# Patient Record
Sex: FEMALE | Race: BLACK OR AFRICAN AMERICAN | Employment: UNEMPLOYED | URBAN - METROPOLITAN AREA
[De-identification: names, ages, dates, MRNs, and addresses within clinical notes are randomized per-mention and may not be internally consistent; named-entity substitution may affect disease eponyms.]

---

## 2017-02-21 LAB
ANTIBODY SCREEN, EXTERNAL: NEGATIVE
CHLAMYDIA, EXTERNAL: NEGATIVE
HBSAG, EXTERNAL: NEGATIVE
HIV, EXTERNAL: NEGATIVE
N. GONORRHEA, EXTERNAL: NEGATIVE
RUBELLA, EXTERNAL: NORMAL
T. PALLIDUM, EXTERNAL: NEGATIVE
TYPE, ABO & RH, EXTERNAL: NORMAL

## 2017-07-16 ENCOUNTER — HOSPITAL ENCOUNTER (OUTPATIENT)
Age: 32
Setting detail: OBSERVATION
Discharge: HOME | End: 2017-07-16
Attending: OBSTETRICS & GYNECOLOGY | Admitting: OBSTETRICS & GYNECOLOGY
Payer: COMMERCIAL

## 2017-07-16 VITALS
HEART RATE: 93 BPM | WEIGHT: 171 LBS | BODY MASS INDEX: 27.48 KG/M2 | RESPIRATION RATE: 16 BRPM | SYSTOLIC BLOOD PRESSURE: 130 MMHG | TEMPERATURE: 98.5 F | HEIGHT: 66 IN | DIASTOLIC BLOOD PRESSURE: 65 MMHG

## 2017-07-16 PROBLEM — Z34.90 PREGNANCY: Status: ACTIVE | Noted: 2017-07-16

## 2017-07-16 LAB
APPEARANCE UR: CLEAR
BACTERIA URNS QL MICRO: NEGATIVE /HPF
BILIRUB UR QL: NEGATIVE
COLOR UR: ABNORMAL
EPITH CASTS URNS QL MICRO: ABNORMAL /LPF
GLUCOSE UR STRIP.AUTO-MCNC: 250 MG/DL
HGB UR QL STRIP: NEGATIVE
HYALINE CASTS URNS QL MICRO: ABNORMAL /LPF (ref 0–5)
KETONES UR QL STRIP.AUTO: NEGATIVE MG/DL
LEUKOCYTE ESTERASE UR QL STRIP.AUTO: ABNORMAL
NITRITE UR QL STRIP.AUTO: NEGATIVE
PH UR STRIP: 7 [PH] (ref 5–8)
PROT UR STRIP-MCNC: NEGATIVE MG/DL
RBC #/AREA URNS HPF: ABNORMAL /HPF (ref 0–5)
SP GR UR REFRACTOMETRY: 1.01 (ref 1–1.03)
UA: UC IF INDICATED,UAUC: ABNORMAL
UROBILINOGEN UR QL STRIP.AUTO: 0.2 EU/DL (ref 0.2–1)
WBC URNS QL MICRO: ABNORMAL /HPF (ref 0–4)

## 2017-07-16 PROCEDURE — 99284 EMERGENCY DEPT VISIT MOD MDM: CPT

## 2017-07-16 PROCEDURE — 81001 URINALYSIS AUTO W/SCOPE: CPT | Performed by: OBSTETRICS & GYNECOLOGY

## 2017-07-16 PROCEDURE — 99218 HC RM OBSERVATION: CPT

## 2017-07-16 PROCEDURE — 87086 URINE CULTURE/COLONY COUNT: CPT | Performed by: OBSTETRICS & GYNECOLOGY

## 2017-07-16 RX ORDER — CALCIUM CARBONATE 200(500)MG
1 TABLET,CHEWABLE ORAL 2 TIMES DAILY
COMMUNITY

## 2017-07-16 RX ORDER — NITROFURANTOIN 25; 75 MG/1; MG/1
100 CAPSULE ORAL 2 TIMES DAILY
Qty: 14 CAP | Refills: 0 | Status: ON HOLD | OUTPATIENT
Start: 2017-07-16 | End: 2017-10-06

## 2017-07-16 NOTE — IP AVS SNAPSHOT
Current Discharge Medication List  
  
START taking these medications Dose & Instructions Dispensing Information Comments Morning Noon Evening Bedtime  
 nitrofurantoin (macrocrystal-monohydrate) 100 mg capsule Commonly known as:  MACROBID Your last dose was: Your next dose is:    
   
   
 Dose:  100 mg Take 1 Cap by mouth two (2) times a day. Quantity:  14 Cap Refills:  0 CONTINUE these medications which have NOT CHANGED Dose & Instructions Dispensing Information Comments Morning Noon Evening Bedtime  
 calcium carbonate 200 mg calcium (500 mg) Chew Commonly known as:  TUMS Your last dose was: Your next dose is:    
   
   
 Dose:  1 Tab Take 1 Tab by mouth two (2) times a day. Refills:  0 PRENATAL + DHA PO Your last dose was: Your next dose is: Take  by mouth. Refills:  0 STOP taking these medications   
 norethindrone-ethinyl estradiol 1-35 mg-mcg Tab Commonly known as:  ORTHO-NOVUM 1-35 TAB, NORTREL 1-35 TAB  
   
  
 oxyCODONE-acetaminophen 5-325 mg per tablet Commonly known as:  PERCOCET Where to Get Your Medications Information on where to get these meds will be given to you by the nurse or doctor. ! Ask your nurse or doctor about these medications  
  nitrofurantoin (macrocrystal-monohydrate) 100 mg capsule

## 2017-07-16 NOTE — IP AVS SNAPSHOT
[address] 
[phone] Patient: Jacqui Neri MRN: [id number] :1985 You are allergic to the following No active allergies Recent Documentation Height Weight Breastfeeding? BMI OB Status Smoking Status 1.676 m 77.6 kg No 27.6 kg/m2 Pregnant Never Smoker Unresulted Labs Order Current Status CULTURE, URINE In process Emergency Contacts Name Discharge Info Relation Home Work Mobile [place] Mays  Spouse [3] [phone] About your hospitalization You were admitted on:  N/A You last received care in the:  [facility] 3 LABOR & DELIVERY You were discharged on:  2017 Unit phone number:  [phone] Why you were hospitalized Your primary diagnosis was:  Not on File Providers Seen During Your Hospitalizations Provider Role Specialty Primary office phone Radha Ramires DO Attending Provider Obstetrics & Gynecology [phone] Your Primary Care Physician (PCP) Primary Care Physician Office Phone Office Fax NONE ** None ** ** None ** Follow-up Information Follow up With Details Comments Contact Info Radha Ramires DO Go on 2017  [address] [facility] [address] [place] [address] 
[phone] Current Discharge Medication List  
  
START taking these medications Dose & Instructions Dispensing Information Comments Morning Noon Evening Bedtime  
 nitrofurantoin (macrocrystal-monohydrate) 100 mg capsule Commonly known as:  MACROBID Your last dose was: Your next dose is:    
   
   
 Dose:  100 mg Take 1 Cap by mouth two (2) times a day. Quantity:  14 Cap Refills:  0 CONTINUE these medications which have NOT CHANGED Dose & Instructions Dispensing Information Comments Morning Noon Evening Bedtime calcium carbonate 200 mg calcium (500 mg) Chew Commonly known as:  TUMS Your last dose was: Your next dose is:    
   
   
 Dose:  1 Tab Take 1 Tab by mouth two (2) times a day. Refills:  0 PRENATAL + DHA PO Your last dose was: Your next dose is: Take  by mouth. Refills:  0 STOP taking these medications   
 norethindrone-ethinyl estradiol 1-35 mg-mcg Tab Commonly known as:  ORTHO-NOVUM 1-35 TAB, NORTREL 1-35 TAB  
   
  
 oxyCODONE-acetaminophen 5-325 mg per tablet Commonly known as:  PERCOCET Where to Get Your Medications Information on where to get these meds will be given to you by the nurse or doctor. ! Ask your nurse or doctor about these medications  
  nitrofurantoin (macrocrystal-monohydrate) 100 mg capsule Discharge Instructions  DISCHARGE INSTRUCTIONS Name: Diamante Ruiz YOB: 1985 Primary Diagnosis: Active Problems: * No active hospital problems. * Introduction: You have visited the hospital because you thought you were in  labor. These guidelines are for your information at home to help prevent repeated problems. In general, you should remember: 
? Empty your bladder every 2-3 hours. ? Avoid breast stimulation (including showers where the water stream is on your breasts)-this can cause contractions. ? Rest means lying down. ? Contractions and cramping happen more often in evening and nighttime. Diet/Diet Restrictions: Follow your regularly prescribed diet Physical Activity / Restrictions / Safety:  
 
* Activity at home is based on how strong your  labor has been, You should follow the following activity guidelines. As tolerated, avoid heavy lifting. and Activity based on symptoms: Lie down on your side if you feel contractions or tightening in your abdomen. Discharge Instructions/ Special Treatment/ Home Care Needs:  
 
Call your provider if: 
? Uterine cramping (menstrual-like cramps, intermittent or constant ? Uterine contractions every 10-15 minutes or more frequently ? Low abdominal pressure ( pelvic pressure) ? Dull low backache (intermittent or constant) ? Increase or change in vaginal discharge ? Feeling that the baby is \"pushing down\" ? Abdominal cramping with or without diarrhea ? Any leaking of fluid or vaginal bleeding If any of these symptoms are experienced, stop what you are doing, lie down on your side, drink two to three glasses of water and wait one hour. If the symptoms persist or get worse, call your provider. Pain Management:  
 
 
 
Discharge Checklist-NURSING TO COMPLETE:  
 
Date and Time of Discharge: Date: 7/16/2017 Time: 4:51 AM 
 
Return of:  
Dental Appliance: Dental Appliances: None Vision: Visual Aid: Glasses Hearing Aid:   
Jewelry: Jewelry: Ring Clothing: Clothing: At bedside Other Valuables: Other Valuables: Cell Phone, [address] Valuables sent to safe: Personal Items Sent to Safe: None Prescription Given: yes Medication Instruction Sheet(s), including side effects, provided: yes Accompanied By: Family Mode of Transportation: 
 
Discharge Disposition: Home I have had the opportunity to make my options or choices for discharge. I have received and understand these instructions. URINARY TRACT INFECTION DISCHARGE INSTRUCTIONS Name: Stefanie Beard YOB: 1985 Primary Diagnosis: Active Problems: * No active hospital problems. * Introduction: You came to the hospital because you had one or some of the following symptoms: pain, tenderness in the bladder area, fever, frequent or painful urination, burning on urination or uterine irritability. Based on your evaluation, you have been diagnosed with a bladder infection. Prescribed medication and instructions follow to treat the infection. You are now ready to be sent home. In general, you should remember: 
 
? Empty your bladder at least every 2-3 hours. An over distended bladder can lead to infection. ? Wipe front to back after urination, avoid wiping back to front as you may transfer bacteria from the rectum to the urethra (opening to the bladder). ? Drink 8-10 glasses of water daily. ? Take medication as prescribed. Take all of the medicine even though you start to feel better. ? Know that bladder infections are the most common cause of pre-term labor. Physical Activity / Restrictions / Safety: As tolerated. Other:  
    
Discharge Instructions/ Special Treatment/ Home Care Needs:  
 
Call your provider if: 
? Your symptoms dont improve 24-36 hours after starting medication. ? Pelvic pressure continues after 24-36 hours of treatment with medication. ? You develop low backache along with pelvic pressure. ? You develop cramps or contractions-more frequent than 8 in one hour or four in twenty minutes. ? Your baby is not moving as much as usual-at least 4 fetal movements in 1 hour after drinking and resting on your side for one hour. ? Other:  
 
 
 
Discharge Orders None Introducing [facility] SERVICES! Dear Bertrand Flanagan: Thank you for requesting a [employer] account. Our records indicate that you already have an active [employer] account. You can access your account anytime at [email or web address] Did you know that you can access your hospital and ER discharge instructions at any time in [employer]? You can also review all of your test results from your hospital stay or ER visit. Additional Information If you have questions, please visit the Frequently Asked Questions section of the [employer] website at [email or web address] Remember, [employer] is NOT to be used for urgent needs. For medical emergencies, dial 911. Now available from your iPhone and Android! General Information Please provide this summary of care documentation to your next provider. Patient Signature:  ____________________________________________________________ Date:  ____________________________________________________________  
  
Paige Spare Provider Signature:  ____________________________________________________________ Date:  ____________________________________________________________

## 2017-07-16 NOTE — PROGRESS NOTES
8440 Patient arrived ambulatory to [facility] with complaints of abdominal cramping and tightness that comes every 7 minutes and started at Ravi [facility] 3701. Patient denies any LOF or VB. Reports positive fetal movement. Patient states that she drank 4-5 bottles full of a 28 ounce water bottle today. 3511 Notified Dr. Marisa De Santiago of patient arrival and status. Order received to send U/A with reflex. She states that she will be right out to see patient. 0 Dr. Marisa De Santiago at the bedside. SVE closed/thick. 0400 Dr. Marisa De Santiago on the unit. Notified that U/A results are back. Plan to monitor patient until [id number] and if no change in patient status she can be discharged. 6680 Patient taken off of monitor. 050 Patient discharged home with discharge instructions, Macrobid prescription, and all questions answered.

## 2017-07-16 NOTE — DISCHARGE INSTRUCTIONS
DISCHARGE INSTRUCTIONS    Name: Eric Appiah  YOB: 1985  Primary Diagnosis: Active Problems:    * No active hospital problems. *      Introduction: You have visited the hospital because you thought you were in  labor. These guidelines are for your information at home to help prevent repeated problems. In general, you should remember:   Empty your bladder every 2-3 hours.  Avoid breast stimulation (including showers where the water stream is on your breasts)-this can cause contractions.  Rest means lying down.  Contractions and cramping happen more often in evening and nighttime. Diet/Diet Restrictions: Follow your regularly prescribed diet    Physical Activity / Restrictions / Safety:     * Activity at home is based on how strong your  labor has been, You should follow the following activity guidelines. As tolerated, avoid heavy lifting. and Activity based on symptoms: Lie down on your side if you feel contractions or tightening in your abdomen. Discharge Instructions/ Special Treatment/ Home Care Needs:     Call your provider if:   Uterine cramping (menstrual-like cramps, intermittent or constant   Uterine contractions every 10-15 minutes or more frequently   Low abdominal pressure ( pelvic pressure)   Dull low backache (intermittent or constant)   Increase or change in vaginal discharge   Feeling that the baby is \"pushing down\"   Abdominal cramping with or without diarrhea   Any leaking of fluid or vaginal bleeding  If any of these symptoms are experienced, stop what you are doing, lie down on your side, drink two to three glasses of water and wait one hour. If the symptoms persist or get worse, call your provider.     Pain Management:         Discharge Checklist-NURSING TO COMPLETE:     Date and Time of Discharge: Date: 2017 Time: 4:51 AM    Return of:   Dental Appliance: Dental Appliances: None  Vision: Visual Aid: Glasses  Hearing Aid:    Jewelry: Jewelry: Ring  Clothing: Clothing: At bedside  Other Valuables: Other Valuables: Cell Phone, Purse  Valuables sent to safe: Personal Items Sent to Safe: None    Prescription Given: yes  Medication Instruction Sheet(s), including side effects, provided: yes    Accompanied By: Family    Mode of Transportation:    Discharge Disposition: Home    I have had the opportunity to make my options or choices for discharge. I have received and understand these instructions. URINARY TRACT INFECTION DISCHARGE INSTRUCTIONS    Name: Oumar Solo  YOB: 1985  Primary Diagnosis: Active Problems:    * No active hospital problems. *      Introduction: You came to the hospital because you had one or some of the following symptoms: pain, tenderness in the bladder area, fever, frequent or painful urination, burning on urination or uterine irritability. Based on your evaluation, you have been diagnosed with a bladder infection. Prescribed medication and instructions follow to treat the infection. You are now ready to be sent home. In general, you should remember:     Empty your bladder at least every 2-3 hours. An over distended bladder can lead to infection.  Wipe front to back after urination, avoid wiping back to front as you may transfer bacteria from the rectum to the urethra (opening to the bladder).  Drink 8-10 glasses of water daily.  Take medication as prescribed. Take all of the medicine even though you start to feel better.  Know that bladder infections are the most common cause of pre-term labor. Physical Activity / Restrictions / Safety:     As tolerated. Other:        Discharge Instructions/ Special Treatment/ Home Care Needs:     Call your provider if:   Your symptoms dont improve 24-36 hours after starting medication.  Pelvic pressure continues after 24-36 hours of treatment with medication.  You develop low backache along with pelvic pressure.    You develop cramps or contractions-more frequent than 8 in one hour or four in twenty minutes.  Your baby is not moving as much as usual-at least 4 fetal movements in 1 hour after drinking and resting on your side for one hour.    Other:

## 2017-07-16 NOTE — H&P
Obstetrics Admission H&P    Colletta Handsome  [id number]  1985    Chief Complaint:  Pregnancy and Contractions    HPI: 27 y.o. female  28w5d with Estimated Date of Delivery: 10/3/17  Pregnancy has been complicated by PCOS. Patient complains of mild \"tightening\"  for 3 hours. Patient denies vaginal bleeding , vaginal leaking of fluid  and burning with urination, urinary frequency, urinary urgency and constipation. Denies unusual activity today or IC. Awakened with a tightening q7 mins. Still feels it now but she says it is improved. ROS:  Pertinent items are noted in HPI. OB History      Para Term  AB Living    3    1     SAB TAB Ectopic Molar Multiple Live Births    1             Past Medical History:   Diagnosis Date    Abnormal Papanicolaou smear of cervix     Abnormal pap in early s - colpo - WNL now    Coagulation disorder ([facility] [address]     D & C for missed AB    Infertility, female     Femara and trigger shot with this pregnancy    Other unknown and unspecified cause of morbidity or mortality     PCOS    PCOS (polycystic ovarian syndrome)     Polycystic disease, ovaries      Past Surgical History:   Procedure Laterality Date    HX LAP CHOLECYSTECTOMY  9/23/15     Dr. Park Dugan      sukhi in left femur and screws    HX OTHER SURGICAL      [place] teeth removed ?  HX WISDOM TEETH EXTRACTION       Social History     Social History    Marital status:      Spouse name: N/A    Number of children: N/A    Years of education: N/A     Occupational History    Not on file.      Social History Main Topics    Smoking status: Never Smoker    Smokeless tobacco: Never Used    Alcohol use No    Drug use: No    Sexual activity: No     Other Topics Concern    Not on file     Social History Narrative     Family History   Problem Relation Age of Onset    Cancer Mother     Hypertension Mother      No Known Allergies  Prior to Admission Medications Prescriptions Last Dose Informant Patient Reported? Taking? PRENATAL VIT 91/IRON/FOLIC/DHA (PRENATAL + DHA PO) 2017 at 0800  Yes Yes   Sig: Take  by mouth.   calcium carbonate (TUMS) 200 mg calcium (500 mg) chew 7/15/2017 at 2100  Yes Yes   Sig: Take 1 Tab by mouth two (2) times a day. norethindrone-ethinyl estradiol (ORTHO-NOVUM 1-35 TAB, NORTREL 1-35 TAB) 1-35 mg-mcg per tablet Not Taking at Unknown time  Yes No   Sig: Take 1 Tab by mouth. oxyCODONE-acetaminophen (PERCOCET) 5-325 mg per tablet Not Taking at Unknown time  No No   Si to 2 tablets every 4 hours as needed for pain      Facility-Administered Medications: None         Vitals:  Patient Vitals for the past 8 hrs:   BP Temp Pulse Resp Height Weight   17 0244 - - - - 5' 6\" (1.676 m) 77.6 kg (171 lb)   17 0242 130/65 98.5 °F (36.9 °C) 93 16 - -     Temp (24hrs), Av.5 °F (36.9 °C), Min:98.5 °F (36.9 °C), Max:98.5 °F (36.9 °C)    I&O:                    Exam:  Patient without distress. Abdomen soft, non-tender               Fundus soft and non tender               Perineum No sign of blood or amniotic fluid               Lower extremities edema No              Cervical Exam:  Closed/Thick/High  Membranes:   Intact    Fetal Heart Rate: nonreactive but appropriate for gestational age  Uterine Activity: None         Labs: No results found for this or any previous visit (from the past 24 hour(s)). Assessment and Plan:    IUP @ 28+ wks with abd. Pain which sounds c/w uterine activity but none detected on toco despite re-adjusting several times. No evidence PTL or PROM. UA sent and pending. PO hydrate, continue to monitor. If no ctx detected after a period of monitoring and UA is neg, will likely d/c home later. Reviewed plan with pt and . Addendum:  UA c/w UTI. Will Rx Macrobid, monitor for ~30 more mins (total of >2 hrs) and d/c home. Advised pt.

## 2017-07-17 LAB
BACTERIA SPEC CULT: NORMAL
CC UR VC: NORMAL
SERVICE CMNT-IMP: NORMAL

## 2017-09-08 LAB — GRBS, EXTERNAL: POSITIVE

## 2017-10-05 ENCOUNTER — HOSPITAL ENCOUNTER (OUTPATIENT)
Dept: OTHER | Age: 32
Discharge: HOME | End: 2017-10-05
Payer: COMMERCIAL

## 2017-10-05 VITALS — WEIGHT: 174 LBS | HEIGHT: 66 IN | BODY MASS INDEX: 27.97 KG/M2

## 2017-10-05 LAB
ERYTHROCYTE [DISTWIDTH] IN BLOOD BY AUTOMATED COUNT: 14 % (ref 11.5–14.5)
HCT VFR BLD AUTO: 38 % (ref 35–47)
HGB BLD-MCNC: 12.6 G/DL (ref 11.5–16)
MCH RBC QN AUTO: 30.1 PG (ref 26–34)
MCHC RBC AUTO-ENTMCNC: 33.2 G/DL (ref 30–36.5)
MCV RBC AUTO: 90.9 FL (ref 80–99)
PLATELET # BLD AUTO: 107 K/UL (ref 150–400)
RBC # BLD AUTO: 4.18 M/UL (ref 3.8–5.2)
WBC # BLD AUTO: 5.2 K/UL (ref 3.6–11)

## 2017-10-05 PROCEDURE — 85027 COMPLETE CBC AUTOMATED: CPT | Performed by: OBSTETRICS & GYNECOLOGY

## 2017-10-05 PROCEDURE — 36415 COLL VENOUS BLD VENIPUNCTURE: CPT | Performed by: OBSTETRICS & GYNECOLOGY

## 2017-10-05 NOTE — PROGRESS NOTES
Patient here for Pre-Admission Testing (PAT) for scheduled induction. PAT packet reviewed with the patient. Labs drawn and sent. Education provided that patient may have clear liquids after midnight and to arrive at 0600 on 10/06/17. Patient verbalizes understanding and sent home with PAT packet for further review. Patient instructed to take no medication(s) on the morning of her scheduled procedure.

## 2017-10-06 ENCOUNTER — HOSPITAL ENCOUNTER (INPATIENT)
Age: 32
LOS: 2 days | Discharge: HOME | End: 2017-10-08
Attending: OBSTETRICS & GYNECOLOGY | Admitting: OBSTETRICS & GYNECOLOGY
Payer: COMMERCIAL

## 2017-10-06 PROCEDURE — 77030031139 HC SUT VCRL2 J&J -A

## 2017-10-06 PROCEDURE — 74011250636 HC RX REV CODE- 250/636

## 2017-10-06 PROCEDURE — 0DQR0ZZ REPAIR ANAL SPHINCTER, OPEN APPROACH: ICD-10-PCS | Performed by: OBSTETRICS & GYNECOLOGY

## 2017-10-06 PROCEDURE — 74011000250 HC RX REV CODE- 250

## 2017-10-06 PROCEDURE — 75410000000 HC DELIVERY VAGINAL/SINGLE

## 2017-10-06 PROCEDURE — 74011250636 HC RX REV CODE- 250/636: Performed by: OBSTETRICS & GYNECOLOGY

## 2017-10-06 PROCEDURE — 75410000003 HC RECOV DEL/VAG/CSECN EA 0.5 HR

## 2017-10-06 PROCEDURE — 65410000002 HC RM PRIVATE OB

## 2017-10-06 PROCEDURE — 74011250637 HC RX REV CODE- 250/637

## 2017-10-06 PROCEDURE — 74011250637 HC RX REV CODE- 250/637: Performed by: OBSTETRICS & GYNECOLOGY

## 2017-10-06 PROCEDURE — 75410000002 HC LABOR FEE PER 1 HR

## 2017-10-06 PROCEDURE — 74011000258 HC RX REV CODE- 258: Performed by: OBSTETRICS & GYNECOLOGY

## 2017-10-06 PROCEDURE — 99283 EMERGENCY DEPT VISIT LOW MDM: CPT

## 2017-10-06 RX ORDER — DOCUSATE SODIUM 100 MG/1
100 CAPSULE, LIQUID FILLED ORAL
Status: DISCONTINUED | OUTPATIENT
Start: 2017-10-06 | End: 2017-10-08 | Stop reason: HOSPADM

## 2017-10-06 RX ORDER — HYDROCORTISONE 1 %
CREAM (GRAM) TOPICAL AS NEEDED
Status: DISCONTINUED | OUTPATIENT
Start: 2017-10-06 | End: 2017-10-08 | Stop reason: HOSPADM

## 2017-10-06 RX ORDER — OXYCODONE AND ACETAMINOPHEN 5; 325 MG/1; MG/1
2 TABLET ORAL
Status: DISCONTINUED | OUTPATIENT
Start: 2017-10-06 | End: 2017-10-08 | Stop reason: HOSPADM

## 2017-10-06 RX ORDER — SODIUM CHLORIDE 0.9 % (FLUSH) 0.9 %
5-10 SYRINGE (ML) INJECTION EVERY 8 HOURS
Status: DISCONTINUED | OUTPATIENT
Start: 2017-10-06 | End: 2017-10-06

## 2017-10-06 RX ORDER — ACETAMINOPHEN 325 MG/1
650 TABLET ORAL
Status: DISCONTINUED | OUTPATIENT
Start: 2017-10-06 | End: 2017-10-08 | Stop reason: HOSPADM

## 2017-10-06 RX ORDER — ONDANSETRON 4 MG/1
4 TABLET, ORALLY DISINTEGRATING ORAL
Status: DISCONTINUED | OUTPATIENT
Start: 2017-10-06 | End: 2017-10-08 | Stop reason: HOSPADM

## 2017-10-06 RX ORDER — SODIUM CHLORIDE 0.9 % (FLUSH) 0.9 %
5-10 SYRINGE (ML) INJECTION AS NEEDED
Status: DISCONTINUED | OUTPATIENT
Start: 2017-10-06 | End: 2017-10-08 | Stop reason: HOSPADM

## 2017-10-06 RX ORDER — IBUPROFEN 400 MG/1
800 TABLET ORAL EVERY 8 HOURS
Status: DISCONTINUED | OUTPATIENT
Start: 2017-10-06 | End: 2017-10-08 | Stop reason: HOSPADM

## 2017-10-06 RX ORDER — SIMETHICONE 80 MG
80 TABLET,CHEWABLE ORAL
Status: DISCONTINUED | OUTPATIENT
Start: 2017-10-06 | End: 2017-10-08 | Stop reason: HOSPADM

## 2017-10-06 RX ORDER — TERBUTALINE SULFATE 1 MG/ML
0.25 INJECTION SUBCUTANEOUS AS NEEDED
Status: DISCONTINUED | OUTPATIENT
Start: 2017-10-06 | End: 2017-10-06

## 2017-10-06 RX ORDER — SODIUM CHLORIDE 900 MG/100ML
INJECTION INTRAVENOUS
Status: DISCONTINUED
Start: 2017-10-06 | End: 2017-10-06

## 2017-10-06 RX ORDER — NALBUPHINE HYDROCHLORIDE 10 MG/ML
10 INJECTION, SOLUTION INTRAMUSCULAR; INTRAVENOUS; SUBCUTANEOUS
Status: DISCONTINUED | OUTPATIENT
Start: 2017-10-06 | End: 2017-10-06

## 2017-10-06 RX ORDER — MINERAL OIL
OIL (ML) ORAL
Status: COMPLETED
Start: 2017-10-06 | End: 2017-10-06

## 2017-10-06 RX ORDER — OXYCODONE AND ACETAMINOPHEN 5; 325 MG/1; MG/1
1 TABLET ORAL
Status: DISCONTINUED | OUTPATIENT
Start: 2017-10-06 | End: 2017-10-08 | Stop reason: HOSPADM

## 2017-10-06 RX ORDER — OXYTOCIN IN 5 % DEXTROSE 30/500 ML
1-25 PLASTIC BAG, INJECTION (ML) INTRAVENOUS
Status: DISCONTINUED | OUTPATIENT
Start: 2017-10-06 | End: 2017-10-06

## 2017-10-06 RX ORDER — NALOXONE HYDROCHLORIDE 0.4 MG/ML
0.4 INJECTION, SOLUTION INTRAMUSCULAR; INTRAVENOUS; SUBCUTANEOUS AS NEEDED
Status: DISCONTINUED | OUTPATIENT
Start: 2017-10-06 | End: 2017-10-06

## 2017-10-06 RX ORDER — SODIUM CHLORIDE 0.9 % (FLUSH) 0.9 %
5-10 SYRINGE (ML) INJECTION AS NEEDED
Status: DISCONTINUED | OUTPATIENT
Start: 2017-10-06 | End: 2017-10-06

## 2017-10-06 RX ORDER — AMMONIA 15 % (W/V)
1 AMPUL (EA) INHALATION AS NEEDED
Status: DISCONTINUED | OUTPATIENT
Start: 2017-10-06 | End: 2017-10-08 | Stop reason: HOSPADM

## 2017-10-06 RX ORDER — SODIUM CHLORIDE, SODIUM LACTATE, POTASSIUM CHLORIDE, CALCIUM CHLORIDE 600; 310; 30; 20 MG/100ML; MG/100ML; MG/100ML; MG/100ML
125 INJECTION, SOLUTION INTRAVENOUS CONTINUOUS
Status: DISCONTINUED | OUTPATIENT
Start: 2017-10-06 | End: 2017-10-06

## 2017-10-06 RX ORDER — OXYTOCIN/RINGER'S LACTATE 20/1000 ML
125-1000 PLASTIC BAG, INJECTION (ML) INTRAVENOUS AS NEEDED
Status: DISCONTINUED | OUTPATIENT
Start: 2017-10-06 | End: 2017-10-08 | Stop reason: HOSPADM

## 2017-10-06 RX ORDER — MAG HYDROX/ALUMINUM HYD/SIMETH 200-200-20
30 SUSPENSION, ORAL (FINAL DOSE FORM) ORAL
Status: DISCONTINUED | OUTPATIENT
Start: 2017-10-06 | End: 2017-10-06

## 2017-10-06 RX ORDER — ACETAMINOPHEN 325 MG/1
650 TABLET ORAL
Status: DISCONTINUED | OUTPATIENT
Start: 2017-10-06 | End: 2017-10-06

## 2017-10-06 RX ORDER — ONDANSETRON 2 MG/ML
4 INJECTION INTRAMUSCULAR; INTRAVENOUS
Status: DISCONTINUED | OUTPATIENT
Start: 2017-10-06 | End: 2017-10-06

## 2017-10-06 RX ORDER — HYDROCORTISONE ACETATE PRAMOXINE HCL 2.5; 1 G/100G; G/100G
CREAM TOPICAL AS NEEDED
Status: DISCONTINUED | OUTPATIENT
Start: 2017-10-06 | End: 2017-10-08 | Stop reason: HOSPADM

## 2017-10-06 RX ORDER — SODIUM CHLORIDE 0.9 % (FLUSH) 0.9 %
5-10 SYRINGE (ML) INJECTION EVERY 8 HOURS
Status: DISCONTINUED | OUTPATIENT
Start: 2017-10-06 | End: 2017-10-08 | Stop reason: HOSPADM

## 2017-10-06 RX ORDER — DIPHENHYDRAMINE HCL 25 MG
25 CAPSULE ORAL
Status: DISCONTINUED | OUTPATIENT
Start: 2017-10-06 | End: 2017-10-08 | Stop reason: HOSPADM

## 2017-10-06 RX ORDER — AMPICILLIN 2 G/1
INJECTION, POWDER, FOR SOLUTION INTRAVENOUS
Status: DISCONTINUED
Start: 2017-10-06 | End: 2017-10-06

## 2017-10-06 RX ORDER — OXYTOCIN IN 5 % DEXTROSE 30/500 ML
PLASTIC BAG, INJECTION (ML) INTRAVENOUS
Status: COMPLETED
Start: 2017-10-06 | End: 2017-10-06

## 2017-10-06 RX ORDER — LIDOCAINE HYDROCHLORIDE 10 MG/ML
INJECTION INFILTRATION; PERINEURAL
Status: COMPLETED
Start: 2017-10-06 | End: 2017-10-06

## 2017-10-06 RX ADMIN — IBUPROFEN 800 MG: 400 TABLET, FILM COATED ORAL at 19:48

## 2017-10-06 RX ADMIN — MINERAL OIL 30 ML: 99.9 LIQUID ORAL; TOPICAL at 08:15

## 2017-10-06 RX ADMIN — AMPICILLIN SODIUM 2 G: 2 INJECTION, POWDER, FOR SOLUTION INTRAVENOUS at 05:37

## 2017-10-06 RX ADMIN — SODIUM CHLORIDE, SODIUM LACTATE, POTASSIUM CHLORIDE, AND CALCIUM CHLORIDE 125 ML/HR: 600; 310; 30; 20 INJECTION, SOLUTION INTRAVENOUS at 05:33

## 2017-10-06 RX ADMIN — DOCUSATE SODIUM 100 MG: 100 CAPSULE, LIQUID FILLED ORAL at 19:47

## 2017-10-06 RX ADMIN — Medication 30000 MILLI-UNITS: at 08:30

## 2017-10-06 RX ADMIN — OXYCODONE HYDROCHLORIDE AND ACETAMINOPHEN 1 TABLET: 5; 325 TABLET ORAL at 17:36

## 2017-10-06 RX ADMIN — LIDOCAINE HYDROCHLORIDE: 10 INJECTION, SOLUTION INFILTRATION; PERINEURAL at 08:27

## 2017-10-06 RX ADMIN — IBUPROFEN 800 MG: 400 TABLET, FILM COATED ORAL at 10:37

## 2017-10-06 RX ADMIN — OXYCODONE HYDROCHLORIDE AND ACETAMINOPHEN 1 TABLET: 5; 325 TABLET ORAL at 23:50

## 2017-10-06 RX ADMIN — OXYCODONE HYDROCHLORIDE AND ACETAMINOPHEN 1 TABLET: 5; 325 TABLET ORAL at 10:38

## 2017-10-06 NOTE — LACTATION NOTE
This note was copied from a baby's chart. Baby nursing well after delivery, deep latch obtained, mother is comfortable, baby feeding vigorously with rhythmic suck, swallow, breathe pattern, both breasts offered, baby is skin to skin for feeding. Mother has history of PCOS. Pumping recommended to expedite milk transition and ensure adequate supply. Baby is a few hours old and is nursing vigorously at this time.

## 2017-10-06 NOTE — IP AVS SNAPSHOT
[address] 
[phone] Patient: Ori Schaffer MRN: [id number] :1985 Current Discharge Medication List  
  
START taking these medications Dose & Instructions Dispensing Information Comments Morning Noon Evening Bedtime  
 ibuprofen 800 mg tablet Commonly known as:  MOTRIN Your last dose was: Your next dose is:    
   
   
 Dose:  800 mg Take 1 Tab by mouth every eight (8) hours as needed for Pain. Quantity:  30 Tab Refills:  1  
     
   
   
   
  
 oxyCODONE-acetaminophen 5-325 mg per tablet Commonly known as:  PERCOCET Your last dose was: Your next dose is:    
   
   
 Dose:  1 Tab Take 1 Tab by mouth every four (4) hours as needed. Max Daily Amount: 6 Tabs. Quantity:  20 Tab Refills:  0 CONTINUE these medications which have NOT CHANGED Dose & Instructions Dispensing Information Comments Morning Noon Evening Bedtime  
 calcium carbonate 200 mg calcium (500 mg) Chew Commonly known as:  TUMS Your last dose was: Your next dose is:    
   
   
 Dose:  1 Tab Take 1 Tab by mouth two (2) times a day. Refills:  0 PRENATAL + DHA PO Your last dose was: Your next dose is: Take  by mouth. Refills:  0 Where to Get Your Medications Information on where to get these meds will be given to you by the nurse or doctor. ! Ask your nurse or doctor about these medications  
  ibuprofen 800 mg tablet  
 oxyCODONE-acetaminophen 5-325 mg per tablet

## 2017-10-06 NOTE — PROCEDURES
Delivery Note    Obstetrician:  Ursula Casas MD    Assistant: none    Pre-Delivery Diagnosis: Term pregnancy. Spontaneous labor, GBS positive    Post-Delivery Diagnosis: same but delivered male infant    Intrapartum Event: None    Procedure: Spontaneous vaginal delivery    Epidural: NO    Monitor:  Fetal Heart Tones - External and Uterine Contractions - External    Indications for instrumental delivery: none    Estimated Blood Loss: 300    Episiotomy: n/a    Laceration(s):  3rd degree    Laceration(s) repair: Vaginal laceration repaired with 2-0 vicryl running locked suture. Rectal exam confirmed intact rectal mucosa. Perineal body repaired with interrupted 2-0 vicryl suture to reapproximate muscle defect. 2-0 vicryl then used running fashion to reapproximate the skin. No suture present in the rectum. An additional figure of eight suture was placed at the introitus for hemostasis. This was done under local anesthesia with 20cc total 1% lidocaine. Presentation: Cephalic    Fetal Description: villeda    Fetal Position: Left Occiput Anterior    Birth Weight: pending    Birth Length: pending    Apgar - One Minute: 5    Apgar - Five Minutes: 9    Umbilical Cord: nuchal cord x 1 loose    Specimens: placenta discarded           Complications:  Shoulder dystocia noted at delivery after gentle traction attempted for delivery. Anterior shoulder was the right shoulder. Erlin performed. Attempt made to deliver posterior arm which did not deliver easily while awaiting nurse position for suprapubic pressure. Suprapubic pressure applied with easy delivery of anterior shoulder. Shoulder dystocia less than 30 seconds.            Cord Blood Results:   Information for the patient's :  Marco Osman [[id number]]   No results found for: PCTABR, PCTDIG, BILI, ABORH    Prenatal Labs:     Lab Results   Component Value Date/Time    ABO/Rh(D) A POSITIVE 2014 08:15 PM    HBsAg, External negative 02/21/2017    HIV, External negative 02/21/2017    Rubella, External immune 02/21/2017    Gonorrhea, External negative 02/21/2017    Chlamydia, External negative 02/21/2017    GrBStrep, External positive 09/08/2017        Attending Attestation: I was present and scrubbed for the entire procedure    Signed By:  Raelyn Dakins, MD     October 6, 2017

## 2017-10-06 NOTE — PROGRESS NOTES
0505- pt arrived complaining of contractions 4 minutes apart with increasing pressure. Pt planning to labor and deliver without epidural.     3108- cervical check by ROB Hernandez RN. 7-8/80/-1    7114- Dr. Danielle Coulter at bedside to discuss plan of care. 3605- Bedside, Verbal and Written shift change report given to Jeremy Andrews RN (oncoming nurse) by Leti Diallo RN (offgoing nurse). Report included the following information SBAR, Kardex, Intake/Output and MAR.

## 2017-10-06 NOTE — IP AVS SNAPSHOT
[address] 
[phone] Patient: Ann Marie [place] MRN: [id number] :1985 You are allergic to the following No active allergies Recent Documentation Breastfeeding? OB Status Smoking Status Unknown Recent pregnancy Never Smoker Emergency Contacts Name Discharge Info Relation Home Work Mobile [facility] DISCHARGE CAREGIVER [3] Spouse [3] [phone] About your hospitalization You were admitted on:  2017 You last received care in the:  [address] [facility] You were discharged on:  2017 Unit phone number:  [phone] Why you were hospitalized Your primary diagnosis was:  Not on File Your diagnoses also included:  Pregnancy, Normal Labor And Delivery Providers Seen During Your Hospitalizations Provider Role Specialty Primary office phone Sravani Chavez DO Attending Provider Obstetrics & Gynecology [phone] Kyle Valdez MD Attending Provider Gynecology [phone] Your Primary Care Physician (PCP) Primary Care Physician Office Phone Office Fax Yari Apple [phone] Follow-up Information Follow up With Details Comments Contact Info [facility] PLACE Call As needed for breastfeeding questions or concerns. [address], [address] [place] [address] 
[phone] Sravani Chavez DO   [address] 
[phone] Current Discharge Medication List  
  
START taking these medications Dose & Instructions Dispensing Information Comments Morning Noon Evening Bedtime  
 ibuprofen 800 mg tablet Commonly known as:  MOTRIN Your last dose was: Your next dose is:    
   
   
 Dose:  800 mg Take 1 Tab by mouth every eight (8) hours as needed for Pain. Quantity:  30 Tab Refills:  1  
     
   
   
   
  
 oxyCODONE-acetaminophen 5-325 mg per tablet Commonly known as:  PERCOCET Your last dose was: Your next dose is:    
   
   
 Dose:  1 Tab Take 1 Tab by mouth every four (4) hours as needed. Max Daily Amount: 6 Tabs. Quantity:  20 Tab Refills:  0 CONTINUE these medications which have NOT CHANGED Dose & Instructions Dispensing Information Comments Morning Noon Evening Bedtime  
 calcium carbonate 200 mg calcium (500 mg) Chew Commonly known as:  TUMS Your last dose was: Your next dose is:    
   
   
 Dose:  1 Tab Take 1 Tab by mouth two (2) times a day. Refills:  0 PRENATAL + DHA PO Your last dose was: Your next dose is: Take  by mouth. Refills:  0 Where to Get Your Medications Information on where to get these meds will be given to you by the nurse or doctor. ! Ask your nurse or doctor about these medications  
  ibuprofen 800 mg tablet  
 oxyCODONE-acetaminophen 5-325 mg per tablet Discharge Instructions POSTPARTUM DISCHARGE INSTRUCTIONS Name:  Barbara Christensen YOB: 1985 Admission Diagnosis:  Pregnancy Normal labor and delivery Discharge Diagnosis:   
Problem List as of 10/8/2017  Date Reviewed: 1/17/2014 Codes Class Noted - Resolved Normal labor and delivery ICD-10-CM: O80 
ICD-9-CM: 943  10/6/2017 - Present Pregnancy ICD-10-CM: Z34.90 ICD-9-CM: V22.2  7/16/2017 - Present Attending Physician:  Tramaine Langford MD 
 
Delivery Type:  Vaginal Childbirth with Episiotomy, Laceration or Tear: [facility] Your body will slowly heal in the next few weeks. It is easy to get too tired and overwhelmed during the first weeks after your baby is born. Changes in your hormones can shift your mood without warning. You may find it hard to meet the extra demands on your energy and time. Take it easy on yourself. Follow-up care is a key part of your treatment and safety. Be sure to make and go to all appointments, and call your doctor if you are having problems. It's also a good idea to know your test results and keep a list of the medicines you take. How can you care for yourself at home? Vaginal Bleeding and Cramps · After delivery, you will have a bloody discharge from the vagina. This will turn pink within a week and then white or yellow after about 10 days. It may last for 2 to 4 weeks or longer, until the uterus has healed. Use pads instead of tampons until you stop bleeding. · Do not worry if you pass some blood clots, as long as they are smaller than a golf ball. If you have a tear or stitches in your vaginal area, change the pad at least every 4 hours to prevent soreness and infection. · You may have cramps for the first few days after childbirth. These are normal and occur as the uterus shrinks to normal size. Take an over-the-counter pain medicine, such as acetaminophen (Tylenol), ibuprofen (Advil, Motrin), or naproxen (Aleve), for cramps. Read and follow all instructions on the label. Do not take aspirin, because it can cause more bleeding. Do not take acetaminophen (Tylenol) and other acetaminophen containing medications (i.e. Percocet) at the same time. Episiotomy, Lacerations or Tears · If you have stitches, they will dissolve on their own and do not need to be removed. · Put ice or a cold pack on your painful area for 10 to 20 minutes at a time, several times a day, for the first few days. Put a thin cloth between the ice and your skin. · Sit in a few inches of warm water (sitz bath) 3 times a day and after bowel movements. The warm water helps with pain and itching. If you do not have a tub, a warm shower might help. Breast fullness · Your breasts may overfill (engorge) in the first few days after delivery. To help milk flow and to relieve pain, warm your breasts in the shower or by using warm, moist towels before nursing. · If you are not nursing, do not put warmth on your breasts or touch your breasts. Wear a tight bra or sports bra and use ice until the fullness goes away. This usually takes 2 to 3 days. · Put ice or a cold pack on your breast after nursing to reduce swelling and pain. Put a thin cloth between the ice and your skin. Activity · Eat a balanced diet. Do not try to lose weight by cutting calories. Keep taking your prenatal vitamins, or take a multivitamin. · Get as much rest as you can. Try to take naps when your baby sleeps during the day. · Get some exercise every day. But do not do any heavy exercise until your doctor says it is okay. · Wait until you are healed (about 4 to 6 weeks) before you have sexual intercourse. Your doctor will tell you when it is okay to have sex. · Talk to your doctor about birth control. You can get pregnant even before your period returns. Also, you can get pregnant while you are breast-feeding. Mental Health · Many women get the \"baby blues\" during the first few days after childbirth. You may lose sleep, feel irritable, and cry easily. You may feel happy one minute and sad the next. Hormone changes are one cause of these emotional changes. Also, the demands of a new baby, along with visits from relatives or other family needs, add to a mother's stress. The \"baby blues\" often peak around the fourth day. Then they ease up in less than 2 weeks. · If your moodiness or anxiety lasts for more than 2 weeks, or if you feel like life is not worth living, you may have postpartum depression. This is different for each mother. Some mothers with serious depression may worry intensely about their infant's well-being.  Others may feel distant from their child. Some mothers might even feel that they might harm their baby. A mother may have signs of paranoia, wondering if someone is watching her. · With all the changes in your life, you may not know if you are depressed. Pregnancy sometimes causes changes in how you feel that are similar to the symptoms of depression. · Symptoms of depression include: · Feeling sad or hopeless and losing interest in daily activities. These are the most common symptoms of depression. · Sleeping too much or not enough. · Feeling tired. You may feel as if you have no energy. · Eating too much or too little. · POSTPARTUM SUPPORT INTERNATIONAL (PSI) offers a Warm line; Chat with the Expert phone sessions; Information and Articles about Pregnancy and Postpartum Mood Disorders; Comprehensive List of Free Support Groups; Knowledgeable local coordinators who will offer support, information, and resources; Guide to Resources on [employer]; Calendar of events in the  mood disorders community; Latest News and Research; and [facility] [address] for [employer]. Remember - You are not alone; You are not to blame; With help, you will be well. 0-311-939-PPD(7826). WWW. POSTPARTUM. NET · Writing or talking about death, such as writing suicide notes or talking about guns, knives, or pills. Keep the numbers for these national suicide hotlines: 1-339-506-TALK (7-783.179.6490) and 3-363-HPNMPMR (8-357-557-194.821.1226). If you or someone you know talks about suicide or feeling hopeless, get help right away. Constipation and Hemorrhoids Drink plenty of fluids, enough so that your urine is light yellow or clear like water. If you have kidney, heart, or liver disease and have to limit fluids, talk with your doctor before you increase the amount of fluids you drink. · Eat plenty of fiber each day. Have a bran muffin or bran cereal for breakfast, and try eating a piece of fruit for a mid-afternoon snack. · For painful, itchy hemorrhoids, put ice or a cold pack on the area several times a day for 10 minutes at a time. Follow this by putting a warm compress on the area for another 10 to 20 minutes or by sitting in a shallow, warm bath. When should you call for help? Call 911 anytime you think you may need emergency care. For example, call if: 
· You are thinking of hurting yourself, your baby, or anyone else. · You passed out (lost consciousness). · You have symptoms of a blood clot in your lung (called a pulmonary embolism). These may include: 
· Sudden chest pain. · Trouble breathing. · Coughing up blood. Call your doctor now or seek immediate medical care if: 
· You have severe vaginal bleeding. · You are soaking through a pad each hour for 2 or more hours. · Your vaginal bleeding seems to be getting heavier or is still bright red 4 days after delivery. · You are dizzy or lightheaded, or you feel like you may faint. · You are vomiting or cannot keep fluids down. · You have a fever. · You have new or more belly pain. · You pass tissue (not just blood). · Your vaginal discharge smells bad. · Your belly feels tender or full and hard. · Your breasts are continuously painful or red. · You feel sad, anxious, or hopeless for more than a few days. · You have sudden, severe pain in your belly. · You have symptoms of a blood clot in your leg (called a deep vein thrombosis), such as: 
· Pain in your calf, back of the knee, thigh, or groin. · Redness and swelling in your leg or groin. · You have symptoms of preeclampsia, such as: 
· Sudden swelling of your face, hands, or feet. · New vision problems (such as dimness or blurring). · A severe headache. · Your blood pressure is higher than it should be or rises suddenly. · You have new nausea or vomiting. Watch closely for changes in your health, and be sure to contact your doctor if you have any problems. Additional Information:  {Postpartum Pregnancy Complications:19616} These are general instructions for a healthy lifestyle: No smoking/ No tobacco products/ Avoid exposure to second hand smoke Surgeon General's Warning:  Quitting smoking now greatly reduces serious risk to your health. Obesity, smoking, and sedentary lifestyle greatly increases your risk for illness A healthy diet, regular physical exercise & weight monitoring are important for maintaining a healthy lifestyle Recognize signs and symptoms of STROKE: 
 
F-face looks uneven A-arms unable to move or move unevenly S-speech slurred or non-existent T-time-call 911 as soon as signs and symptoms begin - DO NOT go  
    back to bed or wait to see if you get better - TIME IS BRAIN. I have had the opportunity to make my options or choices for discharge. I have received and understand these instructions. Discharge Orders None Zilyo Announcement We are excited to announce that we are making your provider's discharge notes available to you in Zilyo. You will see these notes when they are completed and signed by the physician that discharged you from your recent hospital stay. If you have any questions or concerns about any information you see in Zilyo, please call the Health Information Department where you were seen or reach out to your Primary Care Provider for more information about your plan of care. Introducing [facility] & HEALTH SERVICES! Dear Leroy Rider: Thank you for requesting a Zilyo account. Our records indicate that you already have an active Zilyo account. You can access your account anytime at [email or web address] Did you know that you can access your hospital and ER discharge instructions at any time in Zilyo? You can also review all of your test results from your hospital stay or ER visit. Additional Information If you have questions, please visit the Frequently Asked Questions section of the MyChart website at [email or web address] Remember, MyChart is NOT to be used for urgent needs. For medical emergencies, dial 911. Now available from your iPhone and Android! General Information Please provide this summary of care documentation to your next provider. Patient Signature:  ____________________________________________________________ Date:  ____________________________________________________________  
  
[facility] Sharma Provider Signature:  ____________________________________________________________ Date:  ____________________________________________________________

## 2017-10-06 NOTE — ROUTINE PROCESS
TRANSFER - IN REPORT:    Verbal report received from Jenny Herrmann RN(name) on Shelia Mays  being received from L&D(unit) for routine progression of care      Report consisted of patients Situation, Background, Assessment and   Recommendations(SBAR). Information from the following report(s) SBAR, Kardex, Procedure Summary, Intake/Output, MAR and Recent Results was reviewed with the receiving nurse. Opportunity for questions and clarification was provided. Assessment completed upon patients arrival to unit and care assumed.
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
## 2017-10-07 PROCEDURE — 74011250637 HC RX REV CODE- 250/637: Performed by: OBSTETRICS & GYNECOLOGY

## 2017-10-07 PROCEDURE — 65410000002 HC RM PRIVATE OB

## 2017-10-07 RX ORDER — LIDOCAINE HYDROCHLORIDE 10 MG/ML
0.8 INJECTION, SOLUTION EPIDURAL; INFILTRATION; INTRACAUDAL; PERINEURAL ONCE
Status: DISPENSED | OUTPATIENT
Start: 2017-10-07 | End: 2017-10-07

## 2017-10-07 RX ADMIN — OXYCODONE HYDROCHLORIDE AND ACETAMINOPHEN 1 TABLET: 5; 325 TABLET ORAL at 18:52

## 2017-10-07 RX ADMIN — IBUPROFEN 800 MG: 400 TABLET, FILM COATED ORAL at 23:56

## 2017-10-07 RX ADMIN — IBUPROFEN 800 MG: 400 TABLET, FILM COATED ORAL at 15:37

## 2017-10-07 RX ADMIN — IBUPROFEN 800 MG: 400 TABLET, FILM COATED ORAL at 06:59

## 2017-10-07 RX ADMIN — OXYCODONE HYDROCHLORIDE AND ACETAMINOPHEN 1 TABLET: 5; 325 TABLET ORAL at 11:42

## 2017-10-07 RX ADMIN — DOCUSATE SODIUM 100 MG: 100 CAPSULE, LIQUID FILLED ORAL at 18:51

## 2017-10-07 NOTE — LACTATION NOTE
This note was copied from a baby's chart. I did not see the baby at the breast. Mom states baby nursing well today,  deep latch obtained, mother is comfortable, baby feeding vigorously with rhythmic suck, swallow, breathe pattern, audible swallowing, and evident milk transfer, both breasts offered, baby is asleep following feeding.

## 2017-10-07 NOTE — ROUTINE PROCESS
Bedside and Verbal shift change report given to KEV Roy RN (oncoming nurse) by Carlos Dorado RN (offgoing nurse). Report included the following information SBAR, Kardex, Procedure Summary, Intake/Output, MAR, Recent Results and Med Rec Status.
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
## 2017-10-07 NOTE — PROGRESS NOTES
Bedside and Verbal shift change report given to Mello Rae (oncoming nurse) by KEV Hall RN (offgoing nurse). Report included the following information SBAR.

## 2017-10-08 VITALS
RESPIRATION RATE: 16 BRPM | HEART RATE: 81 BPM | SYSTOLIC BLOOD PRESSURE: 134 MMHG | DIASTOLIC BLOOD PRESSURE: 82 MMHG | TEMPERATURE: 98.3 F

## 2017-10-08 PROCEDURE — 74011250637 HC RX REV CODE- 250/637: Performed by: OBSTETRICS & GYNECOLOGY

## 2017-10-08 RX ORDER — IBUPROFEN 800 MG/1
800 TABLET ORAL
Qty: 30 TAB | Refills: 1 | Status: SHIPPED | OUTPATIENT
Start: 2017-10-08 | End: 2022-02-16 | Stop reason: ALTCHOICE

## 2017-10-08 RX ORDER — OXYCODONE AND ACETAMINOPHEN 5; 325 MG/1; MG/1
1 TABLET ORAL
Qty: 20 TAB | Refills: 0 | Status: SHIPPED | OUTPATIENT
Start: 2017-10-08 | End: 2020-11-05

## 2017-10-08 RX ADMIN — OXYCODONE HYDROCHLORIDE AND ACETAMINOPHEN 1 TABLET: 5; 325 TABLET ORAL at 02:21

## 2017-10-08 RX ADMIN — IBUPROFEN 800 MG: 400 TABLET, FILM COATED ORAL at 07:54

## 2017-10-08 RX ADMIN — OXYCODONE HYDROCHLORIDE AND ACETAMINOPHEN 1 TABLET: 5; 325 TABLET ORAL at 11:41

## 2017-10-08 NOTE — DISCHARGE INSTRUCTIONS
POSTPARTUM DISCHARGE INSTRUCTIONS       Name:  Shannan Villar  YOB: 1985  Admission Diagnosis:  Pregnancy  Normal labor and delivery     Discharge Diagnosis:    Problem List as of 10/8/2017  Date Reviewed: 1/17/2014          Codes Class Noted - Resolved    Normal labor and delivery ICD-10-CM: O80  ICD-9-CM: 650  10/6/2017 - Present        Pregnancy ICD-10-CM: Z34.90  ICD-9-CM: V22.2  7/16/2017 - Present            Attending Physician:  Hector Flores MD    Delivery Type:  Vaginal Childbirth with Episiotomy, Laceration or Tear: What To Expect At [address] [place] [address] body will slowly heal in the next few weeks. It is easy to get too tired and overwhelmed during the first weeks after your baby is born. Changes in your hormones can shift your mood without warning. You may find it hard to meet the extra demands on your energy and time. Take it easy on yourself. Follow-up care is a key part of your treatment and safety. Be sure to make and go to all appointments, and call your doctor if you are having problems. It's also a good idea to know your test results and keep a list of the medicines you take. How can you care for yourself at home? Vaginal Bleeding and Cramps  · After delivery, you will have a bloody discharge from the vagina. This will turn pink within a week and then white or yellow after about 10 days. It may last for 2 to 4 weeks or longer, until the uterus has healed. Use pads instead of tampons until you stop bleeding. · Do not worry if you pass some blood clots, as long as they are smaller than a golf ball. If you have a tear or stitches in your vaginal area, change the pad at least every 4 hours to prevent soreness and infection. · You may have cramps for the first few days after childbirth. These are normal and occur as the uterus shrinks to normal size.  Take an over-the-counter pain medicine, such as acetaminophen (Tylenol), ibuprofen (Advil, Motrin), or naproxen (Aaron), for cramps. Read and follow all instructions on the label. Do not take aspirin, because it can cause more bleeding. Do not take acetaminophen (Tylenol) and other acetaminophen containing medications (i.e. Percocet) at the same time. Episiotomy, Lacerations or Tears  · If you have stitches, they will dissolve on their own and do not need to be removed. · Put ice or a cold pack on your painful area for 10 to 20 minutes at a time, several times a day, for the first few days. Put a thin cloth between the ice and your skin. · Sit in a few inches of warm water (sitz bath) 3 times a day and after bowel movements. The warm water helps with pain and itching. If you do not have a tub, a warm shower might help. Breast fullness  · Your breasts may overfill (engorge) in the first few days after delivery. To help milk flow and to relieve pain, warm your breasts in the shower or by using warm, moist towels before nursing. · If you are not nursing, do not put warmth on your breasts or touch your breasts. Wear a tight bra or sports bra and use ice until the fullness goes away. This usually takes 2 to 3 days. · Put ice or a cold pack on your breast after nursing to reduce swelling and pain. Put a thin cloth between the ice and your skin. Activity  · Eat a balanced diet. Do not try to lose weight by cutting calories. Keep taking your prenatal vitamins, or take a multivitamin. · Get as much rest as you can. Try to take naps when your baby sleeps during the day. · Get some exercise every day. But do not do any heavy exercise until your doctor says it is okay. · Wait until you are healed (about 4 to 6 weeks) before you have sexual intercourse. Your doctor will tell you when it is okay to have sex. · Talk to your doctor about birth control. You can get pregnant even before your period returns. Also, you can get pregnant while you are breast-feeding.     Mental Health  · Many women get the \"baby blues\" during the first few days after childbirth. You may lose sleep, feel irritable, and cry easily. You may feel happy one minute and sad the next. Hormone changes are one cause of these emotional changes. Also, the demands of a new baby, along with visits from relatives or other family needs, add to a mother's stress. The \"baby blues\" often peak around the fourth day. Then they ease up in less than 2 weeks. · If your moodiness or anxiety lasts for more than 2 weeks, or if you feel like life is not worth living, you may have postpartum depression. This is different for each mother. Some mothers with serious depression may worry intensely about their infant's well-being. Others may feel distant from their child. Some mothers might even feel that they might harm their baby. A mother may have signs of paranoia, wondering if someone is watching her. · With all the changes in your life, you may not know if you are depressed. Pregnancy sometimes causes changes in how you feel that are similar to the symptoms of depression. · Symptoms of depression include:  · Feeling sad or hopeless and losing interest in daily activities. These are the most common symptoms of depression. · Sleeping too much or not enough. · Feeling tired. You may feel as if you have no energy. · Eating too much or too little. · [employer] (PSI) offers a Warm line; Chat with the Expert phone sessions; Information and Articles about Pregnancy and Postpartum Mood Disorders; Comprehensive List of Free Support Groups; Knowledgeable local coordinators who will offer support, information, and resources; Guide to Resources on [employer]; Calendar of events in the  mood disorders community; Latest News and Research; and [facility] [address] for [employer]. Remember - You are not alone; You are not to blame; With help, you will be well. 2-090-805-PPD(3062). WWW. POSTPARTUM. NET    · Writing or talking about death, such as writing suicide notes or talking about guns, knives, or pills. Keep the numbers for these national suicide hotlines: 5-821-336-TALK (0-679.321.3764) and 0-357-PUMDBGV (6-390.244.6425). If you or someone you know talks about suicide or feeling hopeless, get help right away. Constipation and Hemorrhoids  Drink plenty of fluids, enough so that your urine is light yellow or clear like water. If you have kidney, heart, or liver disease and have to limit fluids, talk with your doctor before you increase the amount of fluids you drink. · Eat plenty of fiber each day. Have a bran muffin or bran cereal for breakfast, and try eating a piece of fruit for a mid-afternoon snack. · For painful, itchy hemorrhoids, put ice or a cold pack on the area several times a day for 10 minutes at a time. Follow this by putting a warm compress on the area for another 10 to 20 minutes or by sitting in a shallow, warm bath. When should you call for help? Call 911 anytime you think you may need emergency care. For example, call if:  · You are thinking of hurting yourself, your baby, or anyone else. · You passed out (lost consciousness). · You have symptoms of a blood clot in your lung (called a pulmonary embolism). These may include:  · Sudden chest pain. · Trouble breathing. · Coughing up blood. Call your doctor now or seek immediate medical care if:  · You have severe vaginal bleeding. · You are soaking through a pad each hour for 2 or more hours. · Your vaginal bleeding seems to be getting heavier or is still bright red 4 days after delivery. · You are dizzy or lightheaded, or you feel like you may faint. · You are vomiting or cannot keep fluids down. · You have a fever. · You have new or more belly pain. · You pass tissue (not just blood). · Your vaginal discharge smells bad. · Your belly feels tender or full and hard. · Your breasts are continuously painful or red.   · You feel sad, anxious, or hopeless for more than a few days.  · You have sudden, severe pain in your belly. · You have symptoms of a blood clot in your leg (called a deep vein thrombosis), such as:  · Pain in your calf, back of the knee, thigh, or groin. · Redness and swelling in your leg or groin. · You have symptoms of preeclampsia, such as:  · Sudden swelling of your face, hands, or feet. · New vision problems (such as dimness or blurring). · A severe headache. · Your blood pressure is higher than it should be or rises suddenly. · You have new nausea or vomiting. Watch closely for changes in your health, and be sure to contact your doctor if you have any problems. Additional Information:  Not applicable    These are general instructions for a healthy lifestyle:    No smoking/ No tobacco products/ Avoid exposure to second hand smoke    Surgeon General's Warning:  Quitting smoking now greatly reduces serious risk to your health. Obesity, smoking, and sedentary lifestyle greatly increases your risk for illness    A healthy diet, regular physical exercise & weight monitoring are important for maintaining a healthy lifestyle    Recognize signs and symptoms of STROKE:    F-face looks uneven    A-arms unable to move or move unevenly    S-speech slurred or non-existent    T-time-call 911 as soon as signs and symptoms begin - DO NOT go       back to bed or wait to see if you get better - TIME IS BRAIN. I have had the opportunity to make my options or choices for discharge. I have received and understand these instructions.

## 2017-10-08 NOTE — PROGRESS NOTES
Bedside and Verbal shift change report given to Mello Rae (oncoming nurse) by KEV Shoemaker RN (offgoing nurse). Report included the following information SBAR.

## 2017-10-08 NOTE — LACTATION NOTE
This note was copied from a baby's chart. Mom and baby scheduled for discharge today. I did not see the baby at the breast. Mom states baby is nursing well and has improved throughout post partum stay, deep latch maintained, mother is comfortable, milk is in transition, baby feeding vigorously with rhythmic suck, swallow, breathe pattern, with audible swallowing, and evident milk transfer, both breasts offered, baby is asleep following feeding. Baby is feeding on demand, voiding and stools present as appropriate over the last 24 hours. We reviewed cluster feeding. Frequent feeding during this brief behavioral phase preceeding milk transition is called cluster feeding. Typical  behavior: baby becomes vigorous at the breast and wants to feed frequently- every 1-2 hours for several feedings. Emptying of the breast twice produces double in subsequent feedings. This is the normal process by which the baby demands his/her supply. This type of frequent feeding is the stimulation which causes lactogenesis II (milk coming in). Mom states baby did some cluster feeding last night. Breasts may become engorged when milk \"comes in\". How milk is made / normal phases of milk production, supply and demand discussed. Taught care of engorged breasts - frequent breastfeeding encouraged, warm compresses and breast massage ac. Then nurse the baby or pump. Apply cold compresses pc x 15 minutes a few times a day for swelling or discomfort. May need to do this care for a couple of days.]    Teaching completed and all questions answered.

## 2017-10-08 NOTE — DISCHARGE SUMMARY
Obstetrical Discharge Summary     Name: Azeem Jones MRN: [id number]  SSN: [id number]    YOB: 1985  Age: 27 y.o. Sex: female      Admit Date: 10/6/2017    Discharge Date: 10/8/2017     Admitting Physician: Kira Villagran MD     Attending Physician:  Kira Villagran MD     * Admission Diagnoses: Pregnancy  Normal labor and delivery    * Discharge Diagnoses:   Information for the patient's :  Maverick Lares [[id number]]   Delivery of a 3.735 kg male infant via Vaginal, Spontaneous Delivery on 10/6/2017 at 8:20 AM  by . Apgars were 5 and 9. Additional Diagnoses:   Hospital Problems as of 10/8/2017  Date Reviewed: 2014          Codes Class Noted - Resolved POA    Normal labor and delivery ICD-10-CM: O80  ICD-9-CM: 601  10/6/2017 - Present Unknown        Pregnancy ICD-10-CM: Z34.90  ICD-9-CM: V22.2  2017 - Present Unknown             Lab Results   Component Value Date/Time    ABO/Rh(D) A POSITIVE 2014 08:15 PM    Rubella, External immune 2017    GrBStrep, External positive 2017    ABO,Rh A positive 2017    There is no immunization history for the selected administration types on file for this patient.     * Procedures: vaginal delivery  * No surgery found *      [place]  Depression Scale  I have been able to laugh and see the funny side of things: As much as I always could  I have looked forward with enjoyment to things: As much as I ever did  I have blamed myself unnecessarily when things went wrong: No, never  I have been anxious or worried for no good reason: No, not at all  I have felt scared or panicky for no very good reason: No, not at all  Things have been getting on top of me: No, I have been coping as well as ever  I have been so unhappy that I have had difficulty sleeping: No, not at all  I have felt sad or miserable: No, not at all  I have been so unhappy that I have been crying: No, never  The thought of harming myself has occurred to me: Never  Total Score: 0    * Discharge Condition: stable    * Hospital Course: Normal hospital course following the delivery. * Disposition: Home    Discharge Medications:   Current Discharge Medication List      START taking these medications    Details   ibuprofen (MOTRIN) 800 mg tablet Take 1 Tab by mouth every eight (8) hours as needed for Pain. Qty: 30 Tab, Refills: 1      oxyCODONE-acetaminophen (PERCOCET) 5-325 mg per tablet Take 1 Tab by mouth every four (4) hours as needed. Max Daily Amount: 6 Tabs. Qty: 20 Tab, Refills: 0         CONTINUE these medications which have NOT CHANGED    Details   PRENATAL VIT 91/IRON/FOLIC/DHA (PRENATAL + DHA PO) Take  by mouth.      calcium carbonate (TUMS) 200 mg calcium (500 mg) chew Take 1 Tab by mouth two (2) times a day. * Follow-up Care/Patient Instructions: Activity: Activity as tolerated, No sex for 6 weeks and No driving while on analgesics  Diet: Regular Diet  Wound Care: Keep wound clean and dry, Ice to area for comfort and As directed    Follow-up Information     Follow up With Details Comments Contact Info    [address] [facility] Call As needed for breastfeeding questions or concerns. [address], [facility] [address]    [place]   [address] [facility] Daren Mancia [address] [place] [id number]  [phone]             Signed By:  Tito Sibley MD     October 8, 2017

## 2017-10-08 NOTE — PROGRESS NOTES
Progress Note                               Patient: Madiha Maravilla MRN: [id number]  SSN: [id number]    YOB: 1985  Age: 27 y.o. Sex: female      Postpartum Day Number 2    Subjective:     Patient doing well postpartum without significant complaints. Voiding without difficulty. Patient reports normal lochia. . Breastfeeding: Yes Denies nausea, vomiting, SOB, chest pain, fever, chills. Objective:     Patient Vitals for the past 18 hrs:   Temp Pulse Resp BP   10/08/17 0219 98.3 °F (36.8 °C) 82 16 120/80   10/07/17 2146 98.1 °F (36.7 °C) 83 16 136/78        Temp (24hrs), Av.2 °F (36.8 °C), Min:98.1 °F (36.7 °C), Max:98.3 °F (36.8 °C)      Physical Exam:    General:   Patient without distress. Abdomen: Soft, fundus firm at level of umbilicus, nontender   Lower Extremities: Negative for swelling, cords, or tenderness. Lab/Data Review: All lab results for the last 24 hours reviewed. Assessment and Plan:     Patient appears to be having an uncomplicated postpartum course. Continue routine perineal care and maternal education. , Will discharge home today. Shoulder dystocia with third degree laceration- discussed bowel regimen  Boy- s/p circ    Signed By: Ericka Grimm.  Padmini Holland MD     2017

## 2020-08-31 NOTE — H&P
History & Physical    Name: Julia Ramirez MRN: [id number]  SSN: [id number]    YOB: 1985  Age: 27 y.o. Sex: female        Subjective:     Estimated Date of Delivery: 10/3/17  OB History    Para Term  AB Living   3    1    SAB TAB Ectopic Molar Multiple Live Births   1           # Outcome Date GA Lbr Roderick/2nd Weight Sex Delivery Anes PTL Lv   3 Current            2 2014           1   39 8 5'10\" F Vag-Spont             Ms. Robyn Moulton is admitted with pregnancy at 40w3d for active labor. Prenatal course was normal. Please see prenatal records for details. Past Medical History:   Diagnosis Date    Abnormal Papanicolaou smear of cervix     Abnormal pap in early  - colpo - WNL now    Infertility, female     Femara and trigger shot with this pregnancy    PCOS (polycystic ovarian syndrome)      Past Surgical History:   Procedure Laterality Date    D&C - missed       HX LAP CHOLECYSTECTOMY  9/23/15     Dr. Peace Coburn      sukhi in left femur and screws    HX OTHER SURGICAL      Burke teeth removed ? Social History     Occupational History    Not on file. Social History Main Topics    Smoking status: Never Smoker    Smokeless tobacco: Never Used    Alcohol use No    Drug use: No    Sexual activity: No     Family History   Problem Relation Age of Onset    Cancer Mother     Hypertension Mother        No Known Allergies  Prior to Admission medications    Medication Sig Start Date End Date Taking? Authorizing Provider   PRENATAL VIT 91/IRON/FOLIC/DHA (PRENATAL + DHA PO) Take  by mouth. Historical Provider   calcium carbonate (TUMS) 200 mg calcium (500 mg) chew Take 1 Tab by mouth two (2) times a day. Historical Provider        Review of Systems: A comprehensive review of systems was negative except for that written in the HPI.     Objective:     Vitals:  Vitals:    10/06/17 0507   BP: 132/74   Pulse: 88        Physical Exam:  Patient without distress. Heart: Regular rate and rhythm  Lung: clear to auscultation throughout lung fields, no wheezes, no rales, no rhonchi and normal respiratory effort  Abdomen: soft, nontender  Fundus: soft and non tender  Perineum: blood absent, amniotic fluid absent  Cervical Exam: 7-8 cm/bulging bag per RN  Lower Extremities:  - Edema No  Membranes:  Intact  Fetal Heart Rate: 150s mod +accels no decels  [place]: CTX every 2-3 minutes  EFW 8# and vertex    Prenatal Labs:   Lab Results   Component Value Date/Time    ABO/Rh(D) A POSITIVE 09/22/2014 08:15 PM    Rubella, External immune 02/21/2017    GrBStrep, External positive 09/08/2017    HBsAg, External negative 02/21/2017    HIV, External negative 02/21/2017    Gonorrhea, External negative 02/21/2017    Chlamydia, External negative 02/21/2017    ABO,Rh A positive 02/21/2017         Assessment/Plan:     Active Problems:    Pregnancy (7/16/2017)         Plan: Admit for Reassuring fetal status, Labor  Progressing normally  Continue expectant management, Continue plan for vaginal delivery. Group B Strep was positive, will treat prophylactically with ampicillin. Patient desires low intervention birth.     Signed By:  Aracelis Small MD     October 6, 2017 Imaging Studies/Labs

## 2020-10-18 ENCOUNTER — APPOINTMENT (OUTPATIENT)
Dept: CT IMAGING | Age: 35
End: 2020-10-18
Attending: STUDENT IN AN ORGANIZED HEALTH CARE EDUCATION/TRAINING PROGRAM
Payer: COMMERCIAL

## 2020-10-18 ENCOUNTER — HOSPITAL ENCOUNTER (EMERGENCY)
Age: 35
Discharge: HOME | End: 2020-10-18
Attending: STUDENT IN AN ORGANIZED HEALTH CARE EDUCATION/TRAINING PROGRAM
Payer: COMMERCIAL

## 2020-10-18 VITALS
RESPIRATION RATE: 14 BRPM | OXYGEN SATURATION: 97 % | WEIGHT: 186.51 LBS | HEART RATE: 77 BPM | BODY MASS INDEX: 29.97 KG/M2 | TEMPERATURE: 98.7 F | SYSTOLIC BLOOD PRESSURE: 146 MMHG | HEIGHT: 66 IN | DIASTOLIC BLOOD PRESSURE: 85 MMHG

## 2020-10-18 DIAGNOSIS — R20.0 RIGHT LEG NUMBNESS: ICD-10-CM

## 2020-10-18 DIAGNOSIS — M54.16 LUMBAR RADICULOPATHY, RIGHT: Primary | ICD-10-CM

## 2020-10-18 LAB
APPEARANCE UR: CLEAR
BACTERIA URNS QL MICRO: NEGATIVE /HPF
BILIRUB UR QL: NEGATIVE
COLOR UR: ABNORMAL
EPITH CASTS URNS QL MICRO: ABNORMAL /LPF
GLUCOSE UR STRIP.AUTO-MCNC: NEGATIVE MG/DL
HCG UR QL: NEGATIVE
HGB UR QL STRIP: ABNORMAL
KETONES UR QL STRIP.AUTO: NEGATIVE MG/DL
LEUKOCYTE ESTERASE UR QL STRIP.AUTO: NEGATIVE
MUCOUS THREADS URNS QL MICRO: ABNORMAL /LPF
NITRITE UR QL STRIP.AUTO: NEGATIVE
PH UR STRIP: 6 [PH] (ref 5–8)
PROT UR STRIP-MCNC: NEGATIVE MG/DL
RBC #/AREA URNS HPF: ABNORMAL /HPF (ref 0–5)
SP GR UR REFRACTOMETRY: 1.02 (ref 1–1.03)
UROBILINOGEN UR QL STRIP.AUTO: 0.2 EU/DL (ref 0.2–1)
WBC URNS QL MICRO: ABNORMAL /HPF (ref 0–4)

## 2020-10-18 PROCEDURE — 96372 THER/PROPH/DIAG INJ SC/IM: CPT

## 2020-10-18 PROCEDURE — 74011250637 HC RX REV CODE- 250/637: Performed by: STUDENT IN AN ORGANIZED HEALTH CARE EDUCATION/TRAINING PROGRAM

## 2020-10-18 PROCEDURE — 74011250636 HC RX REV CODE- 250/636: Performed by: STUDENT IN AN ORGANIZED HEALTH CARE EDUCATION/TRAINING PROGRAM

## 2020-10-18 PROCEDURE — 81001 URINALYSIS AUTO W/SCOPE: CPT

## 2020-10-18 PROCEDURE — 99284 EMERGENCY DEPT VISIT MOD MDM: CPT

## 2020-10-18 PROCEDURE — 72131 CT LUMBAR SPINE W/O DYE: CPT

## 2020-10-18 PROCEDURE — 81025 URINE PREGNANCY TEST: CPT

## 2020-10-18 RX ORDER — METHOCARBAMOL 500 MG/1
750 TABLET, FILM COATED ORAL ONCE
Status: COMPLETED | OUTPATIENT
Start: 2020-10-18 | End: 2020-10-18

## 2020-10-18 RX ORDER — KETOROLAC TROMETHAMINE 30 MG/ML
30 INJECTION, SOLUTION INTRAMUSCULAR; INTRAVENOUS
Status: COMPLETED | OUTPATIENT
Start: 2020-10-18 | End: 2020-10-18

## 2020-10-18 RX ORDER — PREDNISONE 5 MG/1
TABLET ORAL
Qty: 21 TAB | Refills: 0 | Status: SHIPPED | OUTPATIENT
Start: 2020-10-18 | End: 2020-11-05

## 2020-10-18 RX ORDER — METHOCARBAMOL 500 MG/1
500 TABLET, FILM COATED ORAL 4 TIMES DAILY
Qty: 20 TAB | Refills: 0 | Status: SHIPPED | OUTPATIENT
Start: 2020-10-18 | End: 2020-10-23

## 2020-10-18 RX ORDER — DESOGESTREL AND ETHINYL ESTRADIOL 0.15-0.03
KIT ORAL DAILY
COMMUNITY
End: 2022-02-16 | Stop reason: ALTCHOICE

## 2020-10-18 RX ADMIN — METHOCARBAMOL TABLETS 750 MG: 500 TABLET, COATED ORAL at 09:05

## 2020-10-18 RX ADMIN — KETOROLAC TROMETHAMINE 30 MG: 30 INJECTION, SOLUTION INTRAMUSCULAR at 09:06

## 2020-10-18 NOTE — ED TRIAGE NOTES
Pt ambulates to treatment area with steady gait she states that since Wednesday she has had right sided lower back pain and numbness to the right leg. She has had a problem with sciatica since the birth of her child in 2017 but it usually only lasts a day and then she is fine, this time it has been constant since Wednesday. She has been taking Ibuprofen, and Tylenol but it is not helping.

## 2020-10-18 NOTE — ED PROVIDER NOTES
Patient is a 41-year-old female presenting to the emergency department today with back pain. She reports that she has had this intermittently since the birth of her child several years ago however for the past 4 days she has had persistent severe pain in the right lumbar/buttocks region with radiation down the posterior aspect of the leg at times. She states that she has had intermittent numbness of the right lower extremity in the past however since waking up this morning it has been persistent. She reports that it is worse to the lateral aspect of the leg compared to the medial aspect. She took Tylenol and ibuprofen without improvement at home. She denies any bowel or bladder incontinence. No saddle anesthesia. No weakness in the legs. No fevers. No injury or trauma to the back. Past Medical History:   Diagnosis Date    Abnormal Papanicolaou smear of cervix     Abnormal pap in early 20's - colpo - WNL now    Coagulation disorder ([facility] [address]     D & C for missed AB    Infertility, female     Femara and trigger shot with this pregnancy    Other unknown and unspecified cause of morbidity or mortality     PCOS    PCOS (polycystic ovarian syndrome)     Polycystic disease, ovaries        Past Surgical History:   Procedure Laterality Date    HX LAP CHOLECYSTECTOMY  9/23/15     Dr. Mercedes Race      sukhi in left femur and screws    HX OTHER SURGICAL      [place] teeth removed 2008?     HX WISDOM TEETH EXTRACTION           Family History:   Problem Relation Age of Onset    Cancer Mother     Hypertension Mother        Social History     Socioeconomic History    Marital status:      Spouse name: Not on file    Number of children: Not on file    Years of education: Not on file    Highest education level: Not on file   Occupational History    Not on file   Social Needs    Financial resource strain: Not on file    Food insecurity     Worry: Not on file     Inability: Not on file  Transportation needs     Medical: Not on file     Non-medical: Not on file   Tobacco Use    Smoking status: Never Smoker    Smokeless tobacco: Never Used   Substance and Sexual Activity    Alcohol use: No    Drug use: No    Sexual activity: Never   Lifestyle    Physical activity     Days per week: Not on file     Minutes per session: Not on file    Stress: Not on file   Relationships    Social connections     Talks on phone: Not on file     Gets together: Not on file     Attends [employer] service: Not on file     Active member of club or organization: Not on file     Attends meetings of clubs or organizations: Not on file     Relationship status: Not on file    Intimate partner violence     Fear of current or ex partner: Not on file     Emotionally abused: Not on file     Physically abused: Not on file     Forced sexual activity: Not on file   Other Topics Concern    Not on file   Social History Narrative    Not on file         ALLERGIES: Patient has no known allergies. Review of Systems   Constitutional: Negative for chills and fever. HENT: Negative for congestion and rhinorrhea. Eyes: Negative for redness and visual disturbance. Respiratory: Negative for cough and shortness of breath. Cardiovascular: Negative for chest pain and leg swelling. Gastrointestinal: Negative for abdominal pain, diarrhea, nausea and vomiting. Genitourinary: Negative for dysuria, flank pain, frequency, hematuria and urgency. Musculoskeletal: Positive for back pain. Negative for arthralgias, myalgias and neck pain. Skin: Negative for rash and wound. Allergic/Immunologic: Negative for immunocompromised state. Neurological: Positive for numbness. Negative for dizziness and headaches.        Vitals:    10/18/20 0836   BP: (!) 159/92   Pulse: 92   Resp: 16   Temp: 98.7 °F (37.1 °C)   SpO2: 100%   Weight: 84.6 kg (186 lb 8.2 oz)   Height: 5' 6\" (1.676 m)            Physical Exam  Vitals signs and nursing note reviewed. Constitutional:       General: She is not in acute distress. Appearance: She is well-developed. She is not diaphoretic. HENT:      Head: Normocephalic. Mouth/Throat:      Pharynx: No oropharyngeal exudate. Eyes:      General:         Right eye: No discharge. Left eye: No discharge. Pupils: Pupils are equal, round, and reactive to light. Neck:      Musculoskeletal: Normal range of motion and neck supple. Cardiovascular:      Rate and Rhythm: Normal rate and regular rhythm. Heart sounds: Normal heart sounds. No murmur. No friction rub. No gallop. Pulmonary:      Effort: Pulmonary effort is normal. No respiratory distress. Breath sounds: Normal breath sounds. No stridor. No wheezing or rales. Abdominal:      General: Bowel sounds are normal. There is no distension. Palpations: Abdomen is soft. Tenderness: There is no abdominal tenderness. There is no guarding or rebound. Musculoskeletal: Normal range of motion. General: No deformity. Comments: There is tenderness to the right buttocks and paraspinal lumbar region with positive straight leg raise on the right side at about 45 degrees   Skin:     General: Skin is warm and dry. Capillary Refill: Capillary refill takes less than 2 seconds. Findings: No rash. Neurological:      Mental Status: She is alert and oriented to person, place, and time. Comments: Patient has subjective decreased sensation to the lateral aspect of the right leg from the hip to the ankle. She has a slight sensory deficit to the medial aspect of the leg but not as profound as the lateral aspect. She has expected strength with knee flexion and extension, hip flexion extension, dorsi and plantar flexion of the foot bilaterally. Patient is able to stand up and bear full weight. She is able to squat and stand on her tiptoes.    Psychiatric:         Behavior: Behavior normal.        Labs Reviewed: UA not consistent with UTI  Urine hCG negative      Imaging Reviewed:   CT L-spine shows no acute process      Course: Toradol and Robaxin given        MDM:  57-year-old female here with right buttock/lumbar paraspinal pain radiating down the posterior aspect of the leg with associated numbness to the right leg. History and physical most consistent with lumbar radiculopathy. She has no weakness, saddle anesthesia or bowel/bladder incontinence to suggest cauda equina syndrome. She has no fever, midline tenderness or risk factors for epidural abscess. Imaging unremarkable. Treated with Toradol and Robaxin. We will give her a prescription for prednisone and Robaxin. I encouraged her to follow-up with orthospine and contact information provided. Strict return precautions provided both verbally and in writing. Clinical Impression:     ICD-10-CM ICD-9-CM    1. Lumbar radiculopathy, right  M54.16 724.4    2.  Right leg numbness  R20.0 782.0            Disposition: IVETTE Cee, DO

## 2020-10-18 NOTE — DISCHARGE INSTRUCTIONS
RETURN IF WORSENING PAIN, TROUBLE HOLDING STOOL/URINE, RADIATION OF PAIN, OR WEAKNESS/WORSENING NUMBNESS

## 2020-11-05 ENCOUNTER — OFFICE VISIT (OUTPATIENT)
Dept: INTERNAL MEDICINE CLINIC | Age: 35
End: 2020-11-05
Payer: COMMERCIAL

## 2020-11-05 VITALS
HEIGHT: 66 IN | TEMPERATURE: 98.7 F | BODY MASS INDEX: 30.37 KG/M2 | HEART RATE: 88 BPM | SYSTOLIC BLOOD PRESSURE: 130 MMHG | RESPIRATION RATE: 20 BRPM | WEIGHT: 189 LBS | DIASTOLIC BLOOD PRESSURE: 82 MMHG | OXYGEN SATURATION: 98 %

## 2020-11-05 DIAGNOSIS — F32.1 CURRENT MODERATE EPISODE OF MAJOR DEPRESSIVE DISORDER WITHOUT PRIOR EPISODE (HCC): Primary | ICD-10-CM

## 2020-11-05 PROCEDURE — 99204 OFFICE O/P NEW MOD 45 MIN: CPT | Performed by: INTERNAL MEDICINE

## 2020-11-05 RX ORDER — SERTRALINE HYDROCHLORIDE 50 MG/1
50 TABLET, FILM COATED ORAL DAILY
Qty: 30 TAB | Refills: 1 | Status: SHIPPED | OUTPATIENT
Start: 2020-11-05 | End: 2020-12-04 | Stop reason: SDUPTHER

## 2020-11-05 NOTE — PROGRESS NOTES
HISTORY OF PRESENT ILLNESS  Lauren Nava is a 38 y.o. female. HPI  She ended up at [facility] with sciatica and started when pregnant - right leg and numbness got worse and thought it was due to sitting too much but then after having tray continued two times a week and gradually got worse and this past Sat she had a walk and could not walk- end of day so painful ; ended up ER gave her steroids - OTC Doans she got it ; not has done PT ; has appt in December and found stretches on line but do help - benadryl at night help    travels for work       She wonders if she has some anxiety and depression - she had 4 panic attacks over the last two year and usually happens when she is by herself ; feels like more bad days gets more frequent - may have been some postpartum ; does not want to be sad ; she has a 1year old and 8year old   She is not exercising but she does take walks sometimes but every once in awhile and willm do a you tube         Review of Systems   All other systems reviewed and are negative. Physical Exam  Vitals signs and nursing note reviewed. Constitutional:       Appearance: She is well-developed. HENT:      Head: Normocephalic and atraumatic. Right Ear: Ear canal and external ear normal.      Left Ear: Ear canal and external ear normal.      Nose: No mucosal edema or rhinorrhea. Right Sinus: No maxillary sinus tenderness or frontal sinus tenderness. Left Sinus: No maxillary sinus tenderness or frontal sinus tenderness. Mouth/Throat:      Pharynx: Uvula midline. Eyes:      Pupils: Pupils are equal, round, and reactive to light. Neck:      Musculoskeletal: Normal range of motion and neck supple. Thyroid: No thyromegaly. Cardiovascular:      Rate and Rhythm: Normal rate and regular rhythm. Heart sounds: Normal heart sounds. Pulmonary:      Effort: Pulmonary effort is normal.      Breath sounds: Normal breath sounds.    Abdominal:      General: Bowel sounds are normal.      Palpations: Abdomen is soft. Musculoskeletal: Normal range of motion. Lymphadenopathy:      Cervical: No cervical adenopathy. Skin:     General: Skin is warm and dry. Psychiatric:         Behavior: Behavior normal.         Thought Content: Thought content normal.         Judgment: Judgment normal.         ASSESSMENT and PLAN  Diagnoses and all orders for this visit:    1. Current moderate episode of major depressive disorder without prior episode (HCC)  -     sertraline (ZOLOFT) 50 mg tablet; Take 1 Tab by mouth daily. Start 1/2 tablet a day for a week and then one a day    Over 50% of the 25 minutes face to face with Bonny Mcintosh consisted of counseling and/or discussing treatment plans in reference to her depression - treatment and evaluation - needs to take care of her mood and herself and incorporate exercise and eating right as well. Be open and honest about how she feels and have a support system as tends to be quiet and not something she can be vocal about.             lab results and schedule of future lab studies reviewed with patient  reviewed diet, exercise and weight control  cardiovascular risk and specific lipid/LDL goals reviewed  reviewed medications and side effects in detail

## 2020-12-04 ENCOUNTER — OFFICE VISIT (OUTPATIENT)
Dept: INTERNAL MEDICINE CLINIC | Age: 35
End: 2020-12-04
Payer: COMMERCIAL

## 2020-12-04 VITALS
DIASTOLIC BLOOD PRESSURE: 80 MMHG | OXYGEN SATURATION: 97 % | HEIGHT: 66 IN | HEART RATE: 85 BPM | SYSTOLIC BLOOD PRESSURE: 129 MMHG | BODY MASS INDEX: 30.89 KG/M2 | RESPIRATION RATE: 20 BRPM | WEIGHT: 192.2 LBS | TEMPERATURE: 99.1 F

## 2020-12-04 DIAGNOSIS — F32.1 CURRENT MODERATE EPISODE OF MAJOR DEPRESSIVE DISORDER WITHOUT PRIOR EPISODE (HCC): Primary | ICD-10-CM

## 2020-12-04 PROCEDURE — 99214 OFFICE O/P EST MOD 30 MIN: CPT | Performed by: INTERNAL MEDICINE

## 2020-12-04 RX ORDER — CHOLECALCIFEROL (VITAMIN D3) 125 MCG
CAPSULE ORAL
COMMUNITY
End: 2022-02-16 | Stop reason: ALTCHOICE

## 2020-12-04 RX ORDER — SERTRALINE HYDROCHLORIDE 50 MG/1
50 TABLET, FILM COATED ORAL DAILY
Qty: 30 TAB | Refills: 5 | Status: SHIPPED | OUTPATIENT
Start: 2020-12-04 | End: 2021-01-15

## 2020-12-04 NOTE — PROGRESS NOTES
HISTORY OF PRESENT ILLNESS  Andre Coburn is a 38 y.o. female. HPI  Mood: Stable, pt continues to comply with Zoloft 50 mg half tablet BID. Pt reports that she sees significant improvement in her mood with medication compliance. She states that she  Feels much more motivated to do daily tasks. Pt states that she felt a difference after 2 weeks after starting on Zoloft. She notes that she felt a drop in her mood for a few days right before her cycle but felt fine afterwards. Pt reports that she is not currently seeing a therapist but has been journaling. Sinus: Pt reports that she feels some sinus congestion and ear fullness which she suspects is due to change in weather. She notes that she has been complying with Mucinex which provides relief. Review of Systems   All other systems reviewed and are negative. Physical Exam  Vitals signs and nursing note reviewed. Constitutional:       Appearance: Normal appearance. She is normal weight. HENT:      Head: Normocephalic and atraumatic. Right Ear: Tympanic membrane, ear canal and external ear normal.      Left Ear: Tympanic membrane, ear canal and external ear normal.      Nose: Nose normal.      Mouth/Throat:      Mouth: Mucous membranes are dry. Pharynx: Oropharynx is clear. Neck:      Musculoskeletal: Normal range of motion and neck supple. Cardiovascular:      Rate and Rhythm: Normal rate and regular rhythm. Pulses: Normal pulses. Heart sounds: Normal heart sounds. Pulmonary:      Effort: Pulmonary effort is normal.      Breath sounds: Normal breath sounds. Abdominal:      General: Abdomen is flat. Palpations: Abdomen is soft. Musculoskeletal: Normal range of motion. Skin:     General: Skin is warm and dry. Neurological:      General: No focal deficit present. Mental Status: She is alert and oriented to person, place, and time. Mental status is at baseline.    Psychiatric:         Mood and Affect: Mood normal.         Behavior: Behavior normal.         Thought Content: Thought content normal.         Judgment: Judgment normal.         ASSESSMENT and PLAN  Diagnoses and all orders for this visit:    1. Current moderate episode of major depressive disorder without prior episode ([address]  Stable and well-managed. No change in medications. Pt is tolerating current dose very well. Reminded pt about the importance of exercise and healthy diet on mental health. Asked pt to f/u in 4 months. Will continue to monitor for improvements or changes. -     sertraline (ZOLOFT) 50 mg tablet; Take 1 Tab by mouth daily. Start 1/2 tablet a day for a week and then one a day      Lab results and schedule of future lab studies reviewed with patient. Reviewed diet, exercise and weight control. Written by Maddy Kirk, as dictated by Frances Mansfield MD.     Current diagnosis and concerns discussed with pt at length. Understands risks and benefits or current treatment plan and medications and accepts the treatment and medication with any possible risks. Pt asks appropriate questions which were answered. Pt instructed to call with any concerns or problems.

## 2021-01-14 ENCOUNTER — TELEPHONE (OUTPATIENT)
Dept: INTERNAL MEDICINE CLINIC | Age: 36
End: 2021-01-14

## 2021-01-14 NOTE — TELEPHONE ENCOUNTER
----- Message from Thomasine Kawasaki sent at 1/14/2021 12:05 PM EST -----  Regarding: Dr. Slick Doss Message/Vendor Calls    Caller's first and last name: Mirza Al      Reason for call:sinus congestion and pressure       Callback required yes/no and why:yes      Best contact number(s): [phone]      Details to clarify the request:can you call in a prescription to [facility] Dr. Thomasine Kawasaki

## 2021-01-14 NOTE — TELEPHONE ENCOUNTER
PSR called patient to advise of virtual appointment time change 1/15/2021. Patient thankful for call, advised 8AM great for her.

## 2021-01-15 ENCOUNTER — VIRTUAL VISIT (OUTPATIENT)
Dept: INTERNAL MEDICINE CLINIC | Age: 36
End: 2021-01-15
Payer: COMMERCIAL

## 2021-01-15 DIAGNOSIS — J01.00 ACUTE NON-RECURRENT MAXILLARY SINUSITIS: Primary | ICD-10-CM

## 2021-01-15 DIAGNOSIS — F32.1 CURRENT MODERATE EPISODE OF MAJOR DEPRESSIVE DISORDER WITHOUT PRIOR EPISODE (HCC): ICD-10-CM

## 2021-01-15 PROCEDURE — 99214 OFFICE O/P EST MOD 30 MIN: CPT | Performed by: INTERNAL MEDICINE

## 2021-01-15 RX ORDER — AZITHROMYCIN 250 MG/1
250 TABLET, FILM COATED ORAL SEE ADMIN INSTRUCTIONS
Qty: 6 TAB | Refills: 0 | Status: SHIPPED | OUTPATIENT
Start: 2021-01-15 | End: 2021-01-20

## 2021-01-15 RX ORDER — SERTRALINE HYDROCHLORIDE 50 MG/1
75 TABLET, FILM COATED ORAL DAILY
Qty: 45 TAB | Refills: 5
Start: 2021-01-15 | End: 2021-03-26 | Stop reason: SDUPTHER

## 2021-01-15 NOTE — PROGRESS NOTES
Quang Billy (: 1985) is a 38 y.o. female, established patient, here for evaluation of the following chief complaint(s):   Sinus Infection       ASSESSMENT/PLAN:  1. Acute non-recurrent maxillary sinusitis  Not at goal. Directed pt to [facility]der since it can cause GI issues with long term use. Rx Augmentin. Will continue to monitor for improvements or changes. 2. Current moderate episode of major depressive disorder without prior episode ([address]  Not at goal with some issues related to menstrual cycle. Increased pt's Zoloft to 2 tablets /day. Had conversation with pt that she can start complying with 1.5 tablets and see if she needs to additional half tablet. Will continue to monitor for improvements or changes. No follow-ups on file. SUBJECTIVE/OBJECTIVE:  HPI  Pt reports that she tested negative for COVID yesterday. She notes that she has ongoing sinus congestion which is worsening. She states that she is complying with Mucinex and BC Sinus Pain and Congestion. Mood: Stable, pt continues to comply with Zoloft with no sfx. Pt reports that she struggles with mood when her period is coming up. Review of Systems   HENT: Positive for congestion. All other systems reviewed and are negative. No flowsheet data found. Physical Exam  Constitutional:       Appearance: Normal appearance. HENT:      Head: Normocephalic and atraumatic. Nose: Nose normal.      Mouth/Throat:      Mouth: Mucous membranes are moist.   Eyes:      Extraocular Movements: Extraocular movements intact. Neck:      Musculoskeletal: Normal range of motion. Pulmonary:      Effort: Pulmonary effort is normal.   Musculoskeletal: Normal range of motion. Neurological:      General: No focal deficit present. Mental Status: She is oriented to person, place, and time. Psychiatric:         Mood and Affect: Mood normal.         Behavior: Behavior normal.         Thought Content:  Thought content normal. Judgment: Judgment normal.             Miriam Lynch is being evaluated by a Virtual Visit (video visit) encounter to address concerns as mentioned above. A caregiver was present when appropriate. Due to this being a TeleHealth encounter ([facility] public health emergency), evaluation of the following organ systems was limited: Vitals/Constitutional/EENT/Resp/CV/GI//MS/Neuro/Skin/Heme-Lymph-Imm. Pursuant to the emergency declaration under the [address] [place] [address], [address] [place] [address] authority and the [employer] Act, this Virtual Visit was conducted with patient's (and/or legal guardian's) consent, to reduce the patient's risk of exposure to COVID-19 and provide necessary medical care. The patient (and/or legal guardian) has also been advised to contact this office for worsening conditions or problems, and seek emergency medical treatment and/or call 911 if deemed necessary. Patient identification was verified at the start of the visit: YES    Services were provided through a video synchronous discussion virtually to substitute for in-person clinic visit. Patient was located at home and provider was located in office or at home. An electronic signature was used to authenticate this note.   -- Freddy Baires

## 2021-03-26 DIAGNOSIS — F32.1 CURRENT MODERATE EPISODE OF MAJOR DEPRESSIVE DISORDER WITHOUT PRIOR EPISODE (HCC): ICD-10-CM

## 2021-03-26 RX ORDER — SERTRALINE HYDROCHLORIDE 50 MG/1
75 TABLET, FILM COATED ORAL DAILY
Qty: 45 TAB | Refills: 1 | Status: SHIPPED | OUTPATIENT
Start: 2021-03-26 | End: 2021-05-29

## 2021-03-26 NOTE — TELEPHONE ENCOUNTER
----- Message from Carmelita Barron sent at 3/26/2021  9:20 AM EDT -----  Regarding: /Antoinette/refill  Contact: [phone]  Medication Refill    Caller (if not patient): N/A      Relationship of caller (if not patient): N/A       Best contact number(s): [phone]       Name of medication and dosage if known: Sertraline 50mg  she takes 1.5 pills a day totaling 75mg per day      Is patient out of this medication (yes/no):  No      Pharmacy name: [facility]    Pharmacy listed in chart? (yes/no): Yes  Pharmacy phone number: [phone]      Details to clarify the request: N/A      Carmelita Barron

## 2021-09-20 NOTE — TELEPHONE ENCOUNTER
We need to do a VV tomorrow morning- can you call her and make that? 8:00? Normal vision: sees adequately in most situations; can see medication labels, newsprint

## 2021-10-21 ENCOUNTER — TELEPHONE (OUTPATIENT)
Dept: INTERNAL MEDICINE CLINIC | Age: 36
End: 2021-10-21

## 2021-10-21 DIAGNOSIS — Z00.00 ROUTINE GENERAL MEDICAL EXAMINATION AT A HEALTH CARE FACILITY: Primary | ICD-10-CM

## 2021-10-21 NOTE — TELEPHONE ENCOUNTER
----- Message from Juan José Hearn sent at 10/21/2021 10:35 AM EDT -----  Subject: Referral Request    QUESTIONS   Reason for referral request? Blood work   Has the physician seen you for this condition before? Yes  Select a date? 2021-01-15  Select the Provider the patient wants to be referred to, if known (PCP or   Specialist)? [facility]   Preferred Specialist (if applicable)? Do you already have an appointment scheduled? Yes  Select Scheduled Date? 2021-11-15  Select Scheduled Physician? Adrianna Rodriguez   Additional Information for Provider?   ---------------------------------------------------------------------------  --------------  Meena ESCOBAR  What is the best way for the office to contact you? OK to leave message on   voicemail  Preferred Call Back Phone Number?  [phone]

## 2021-10-22 NOTE — TELEPHONE ENCOUNTER
Spoke with patient and advised her that a lab slip will be placed at the  for her to come get labs drawn prior to her appointment if she would like to do so. Pt understood and was thankful for the call.

## 2022-02-16 ENCOUNTER — VIRTUAL VISIT (OUTPATIENT)
Dept: INTERNAL MEDICINE CLINIC | Age: 37
End: 2022-02-16
Payer: COMMERCIAL

## 2022-02-16 DIAGNOSIS — F32.1 CURRENT MODERATE EPISODE OF MAJOR DEPRESSIVE DISORDER WITHOUT PRIOR EPISODE (HCC): ICD-10-CM

## 2022-02-16 DIAGNOSIS — J01.00 ACUTE NON-RECURRENT MAXILLARY SINUSITIS: Primary | ICD-10-CM

## 2022-02-16 PROCEDURE — 99213 OFFICE O/P EST LOW 20 MIN: CPT | Performed by: INTERNAL MEDICINE

## 2022-02-16 RX ORDER — BISMUTH SUBSALICYLATE 262 MG
1 TABLET,CHEWABLE ORAL DAILY
COMMUNITY

## 2022-02-16 RX ORDER — AZITHROMYCIN 250 MG/1
250 TABLET, FILM COATED ORAL SEE ADMIN INSTRUCTIONS
Qty: 6 TABLET | Refills: 0 | Status: SHIPPED | OUTPATIENT
Start: 2022-02-16 | End: 2022-02-21

## 2022-02-16 NOTE — PROGRESS NOTES
Nayely Spann (: 1985) is a 42 y.o. female, established patient, here for evaluation of the following chief complaint(s):   Nasal Congestion (yellow mucus)       ASSESSMENT/PLAN:  Below is the assessment and plan developed based on review of pertinent history, labs, studies, and medications. 1. Acute non-recurrent maxillary sinusitis  -     azithromycin (ZITHROMAX) 250 mg tablet; Take 1 Tablet by mouth See Admin Instructions for 5 days. , Normal, Disp-6 Tablet, R-0  I am confident that she is suffering from a sinus infection. I recommended Flonase and rx a z-pack. 2. Current moderate episode of major depressive disorder without prior episode ([address]  Stable and well-managed. Continue with ongoing regimen of Zoloft. No follow-ups on file. SUBJECTIVE/OBJECTIVE:  HPI    Sinus infection: Pt's sx presented Saturday morning as post-nasal drip, sinus pressure, congestion, and yellow/thick mucous. She notes a hx of reacting poorly to her husbands air freshener, which she was exposed to Friday night. Pt endorses fatigue but denies fever, chills, or loss of taste/smell. Of note, she had COVID-19 in January. Review of Systems   Constitutional: Positive for fatigue. HENT: Positive for congestion, postnasal drip and sinus pressure.          Patient-Reported Temperature: 98.8       Physical Exam    [INSTRUCTIONS:  \"[x]\" Indicates a positive item  \"[]\" Indicates a negative item  -- DELETE ALL ITEMS NOT EXAMINED]    Constitutional: [x] Appears well-developed and well-nourished [x] No apparent distress      [] Abnormal -     Mental status: [x] Alert and awake  [x] Oriented to person/place/time [x] Able to follow commands    [] Abnormal -     Eyes:   EOM    [x]  Normal    [] Abnormal -   Sclera  [x]  Normal    [] Abnormal -          Discharge [x]  None visible   [] Abnormal -     HENT: [x] Normocephalic, atraumatic  [] Abnormal -   [x] Mouth/Throat: Mucous membranes are moist    External Ears [x] Normal  [] Abnormal -    Neck: [x] No visualized mass [] Abnormal -     Pulmonary/Chest: [x] Respiratory effort normal   [x] No visualized signs of difficulty breathing or respiratory distress        [] Abnormal -      Musculoskeletal:   [x] Normal gait with no signs of ataxia         [x] Normal range of motion of neck        [] Abnormal -     Neurological:        [x] No Facial Asymmetry (Cranial nerve 7 motor function) (limited exam due to video visit)          [x] No gaze palsy        [] Abnormal -          Skin:        [x] No significant exanthematous lesions or discoloration noted on facial skin         [] Abnormal -            Psychiatric:       [x] Normal Affect [] Abnormal -        [x] No Hallucinations    Other pertinent observable physical exam findings:-        On this date 02/16/2022 I have spent 25 minutes reviewing previous notes, test results and face to face (virtual) with the patient discussing the diagnosis and importance of compliance with the treatment plan as well as documenting on the day of the visit. Lyla Faith, was evaluated through a synchronous (real-time) audio-video encounter. The patient (or guardian if applicable) is aware that this is a billable service, which includes applicable co-pays. Verbal consent to proceed has been obtained. The visit was conducted pursuant to the emergency declaration under the [address] [place] [address] authority and the [employer] Act. Patient identification was verified, and a caregiver was present when appropriate. The patient was located at home in a state where the provider was licensed to provide care. An electronic signature was used to authenticate this note. Written by Alejo Wick as dictated by Dr. Kaleb Velasco.    -- Alejo Wick

## 2022-03-19 PROBLEM — Z34.90 PREGNANCY: Status: ACTIVE | Noted: 2017-07-16

## 2022-07-11 ENCOUNTER — TELEPHONE (OUTPATIENT)
Dept: INTERNAL MEDICINE CLINIC | Age: 37
End: 2022-07-11

## 2022-07-11 NOTE — TELEPHONE ENCOUNTER
----- Message from Alberto Hylton sent at 7/11/2022  9:52 AM EDT -----  Subject: Message to Provider    QUESTIONS  Information for Provider? PT called in regards to taking an at home UTI   test and tested positive. PT wanted to know if there was any way she could   be prescribed mediation or does she need to come in to take another test   in office. ---------------------------------------------------------------------------  --------------  Noman DESAI  [phone]; OK to leave message on voicemail  ---------------------------------------------------------------------------  --------------  SCRIPT ANSWERS  Relationship to Patient?  Self

## 2022-10-19 ENCOUNTER — OFFICE VISIT (OUTPATIENT)
Dept: INTERNAL MEDICINE CLINIC | Age: 37
End: 2022-10-19

## 2022-10-19 VITALS
BODY MASS INDEX: 28.45 KG/M2 | RESPIRATION RATE: 14 BRPM | HEART RATE: 98 BPM | DIASTOLIC BLOOD PRESSURE: 92 MMHG | WEIGHT: 177 LBS | HEIGHT: 66 IN | SYSTOLIC BLOOD PRESSURE: 151 MMHG | OXYGEN SATURATION: 95 % | TEMPERATURE: 98.2 F

## 2022-10-19 DIAGNOSIS — H10.32 ACUTE CONJUNCTIVITIS OF LEFT EYE, UNSPECIFIED ACUTE CONJUNCTIVITIS TYPE: Primary | ICD-10-CM

## 2022-10-19 PROCEDURE — 99213 OFFICE O/P EST LOW 20 MIN: CPT | Performed by: INTERNAL MEDICINE

## 2022-10-19 RX ORDER — OFLOXACIN 3 MG/ML
2 SOLUTION/ DROPS OPHTHALMIC 4 TIMES DAILY
Qty: 10 ML | Refills: 0 | Status: SHIPPED | OUTPATIENT
Start: 2022-10-19 | End: 2022-10-26

## 2022-10-19 RX ORDER — OFLOXACIN 3 MG/ML
2 SOLUTION/ DROPS OPHTHALMIC 4 TIMES DAILY
Qty: 10 ML | Refills: 0 | Status: SHIPPED | OUTPATIENT
Start: 2022-10-19 | End: 2022-10-19

## 2022-10-19 NOTE — PROGRESS NOTES
Note   Chief Complaint   Red eye    Jazz Shabazz is a 42 y.o. female     1. Acute conjunctivitis of left eye, unspecified acute conjunctivitis type  -     ofloxacin (FLOXIN) 0.3 % ophthalmic solution; Administer 2 Drops to left eye four (4) times daily for 7 days. , Normal, Disp-10 mL, R-0   Left eye - felt like something in her eye  Used ibuprofen, zyrtec, benadryl  Having a lot of crusty, yellow drainage   Has been having some sinus symptoms  Son in  and has been sick for a while   Notices a little pain near the end of the day   Sometimes feels like something is covering her eye but no blurry vision otherwise  Ofloxacin eye drops sent for conjunctivitis. Counseled that condition is contagious, wash hands frequently. Call if no improvement      Benefits, risks, possible drug interactions, and side effects of all new medications were reviewed with the patient. Pt verbalized understanding. Return to clinic:  as needed    An electronic signature was used to authenticate this note. Jose Ramon Dutton MD  [facility]  10/19/2022    No future appointments. Objective   Vitals:       Visit Vitals  BP (!) 151/92 (BP 1 Location: Left upper arm, BP Patient Position: Sitting, BP Cuff Size: Small adult)   Pulse 98   Temp 98.2 °F (36.8 °C) (Oral)   Resp 14   Ht 5' 6\" (1.676 m)   Wt 177 lb (80.3 kg)   LMP 10/12/2022   SpO2 95%   BMI 28.57 kg/m²        Physical Exam  Constitutional:       Appearance: Normal appearance. She is not ill-appearing. Eyes:      Extraocular Movements: Extraocular movements intact. Pupils: Pupils are equal, round, and reactive to light. Comments: Mild erythema of conjunctiva; no purulent drainage noted   Pulmonary:      Effort: No respiratory distress. Neurological:      Mental Status: She is alert.         Current Outpatient Medications   Medication Sig    ofloxacin (FLOXIN) 0.3 % ophthalmic solution Administer 2 Drops to left eye four (4) times daily for 7 days. multivitamin (ONE A DAY) tablet Take 1 Tablet by mouth daily. calcium carbonate (TUMS) 200 mg calcium (500 mg) chew Take 1 Tab by mouth two (2) times a day. sertraline (ZOLOFT) 50 mg tablet TAKE 1 AND 1/2 TABLETS BY MOUTH DAILY (Patient not taking: Reported on 10/19/2022)     No current facility-administered medications for this visit.

## 2022-12-13 ENCOUNTER — TELEPHONE (OUTPATIENT)
Dept: INTERNAL MEDICINE CLINIC | Age: 37
End: 2022-12-13

## 2022-12-13 NOTE — TELEPHONE ENCOUNTER
Patient calling stating she has been experiencing cough, cold, congestion for several weeks. Is feeling better but still has a cough and stuffiness with discolored phlegm. Was requesting appt but no appts available. Is willing to virtual if need be. Please advise.        Patient can be reached at [phone]

## 2023-08-18 ENCOUNTER — OFFICE VISIT (OUTPATIENT)
Age: 38
End: 2023-08-18
Payer: COMMERCIAL

## 2023-08-18 VITALS
DIASTOLIC BLOOD PRESSURE: 84 MMHG | RESPIRATION RATE: 16 BRPM | HEIGHT: 66 IN | BODY MASS INDEX: 26.71 KG/M2 | SYSTOLIC BLOOD PRESSURE: 128 MMHG | OXYGEN SATURATION: 99 % | TEMPERATURE: 97.4 F | HEART RATE: 72 BPM | WEIGHT: 166.2 LBS

## 2023-08-18 DIAGNOSIS — H00.011 HORDEOLUM EXTERNUM OF RIGHT UPPER EYELID: Primary | ICD-10-CM

## 2023-08-18 PROCEDURE — 99213 OFFICE O/P EST LOW 20 MIN: CPT | Performed by: NURSE PRACTITIONER

## 2023-08-18 RX ORDER — ERYTHROMYCIN 5 MG/G
OINTMENT OPHTHALMIC
Qty: 3.5 G | Refills: 0 | Status: SHIPPED | OUTPATIENT
Start: 2023-08-18

## 2023-08-18 SDOH — ECONOMIC STABILITY: INCOME INSECURITY: HOW HARD IS IT FOR YOU TO PAY FOR THE VERY BASICS LIKE FOOD, HOUSING, MEDICAL CARE, AND HEATING?: NOT HARD AT ALL

## 2023-08-18 SDOH — ECONOMIC STABILITY: FOOD INSECURITY: WITHIN THE PAST 12 MONTHS, YOU WORRIED THAT YOUR FOOD WOULD RUN OUT BEFORE YOU GOT MONEY TO BUY MORE.: NEVER TRUE

## 2023-08-18 SDOH — ECONOMIC STABILITY: FOOD INSECURITY: WITHIN THE PAST 12 MONTHS, THE FOOD YOU BOUGHT JUST DIDN'T LAST AND YOU DIDN'T HAVE MONEY TO GET MORE.: NEVER TRUE

## 2023-08-18 SDOH — ECONOMIC STABILITY: HOUSING INSECURITY
IN THE LAST 12 MONTHS, WAS THERE A TIME WHEN YOU DID NOT HAVE A STEADY PLACE TO SLEEP OR SLEPT IN A SHELTER (INCLUDING NOW)?: NO

## 2023-08-18 ASSESSMENT — ENCOUNTER SYMPTOMS
CHEST TIGHTNESS: 0
EYE ITCHING: 1
BACK PAIN: 0
SINUS PRESSURE: 0
NAUSEA: 0
DIARRHEA: 0
EYE REDNESS: 0
ABDOMINAL PAIN: 0
RHINORRHEA: 0
SHORTNESS OF BREATH: 0
COUGH: 0
EYE PAIN: 1
VOMITING: 0
RESPIRATORY NEGATIVE: 1
BLOOD IN STOOL: 0
CONSTIPATION: 0
EYE DISCHARGE: 1
GASTROINTESTINAL NEGATIVE: 1
SINUS PAIN: 0

## 2023-08-18 ASSESSMENT — PATIENT HEALTH QUESTIONNAIRE - PHQ9
2. FEELING DOWN, DEPRESSED OR HOPELESS: 0
SUM OF ALL RESPONSES TO PHQ QUESTIONS 1-9: 0
SUM OF ALL RESPONSES TO PHQ QUESTIONS 1-9: 0
1. LITTLE INTEREST OR PLEASURE IN DOING THINGS: 0
SUM OF ALL RESPONSES TO PHQ QUESTIONS 1-9: 0
SUM OF ALL RESPONSES TO PHQ QUESTIONS 1-9: 0
SUM OF ALL RESPONSES TO PHQ9 QUESTIONS 1 & 2: 0

## 2023-08-18 NOTE — PROGRESS NOTES
Assessment and Plan     1. Hordeolum externum of right upper eyelid: Continue with warm compresses, will start erythromycin ophthalmic ointment. Continue with Ibuprofen or tylenol for pain as needed. Return instructions given. Pt verbalized understanding.   -     erythromycin (ROMYCIN) 5 MG/GM ophthalmic ointment; Instill ~1 cm ribbon into affected eye(s) 4 times daily for 7 days (Ref), Disp-3.5 g, R-0, Normal     Benefits, risks, possible drug interactions, and side effects of all new medications were reviewed with the patient. Pt verbalized understanding. An electronic signature was used to authenticate this note. SULTANA Carpio - CNP  8/18/2023    Follow-up and Dispositions    Return if symptoms worsen or fail to improve. History of Present Illness   Chief Complaint     Ely Saini is a 34 y.o. female here for had concerns including Stye (Mrs. James Stephen presents for examination on Right eye stye for 5 days. She admits itchy, red and tender. Patient has tried natural remedies with no resolution. ). Stye on R upper lid noted 5 days ago, worsening since onset. Has tried warm compresses, ice, tylenol, benadryl and ibuprofen for symptoms with some resolutions on itchiness and discomfort. Admits discharge to R eye. Denies sick contacts. Denies fever, chills, fatigue, cough. Review of Systems  Review of Systems   Constitutional: Negative. Negative for chills, fatigue, fever and unexpected weight change. HENT: Negative. Negative for congestion, ear discharge, ear pain, rhinorrhea, sinus pressure and sinus pain. Eyes:  Positive for pain (R eye discomfort), discharge and itching. Negative for redness and visual disturbance. Respiratory: Negative. Negative for cough, chest tightness and shortness of breath. Cardiovascular: Negative. Negative for chest pain and palpitations. Gastrointestinal: Negative.   Negative for abdominal pain, blood in stool, constipation, diarrhea, nausea

## 2023-08-29 ENCOUNTER — TELEPHONE (OUTPATIENT)
Age: 38
End: 2023-08-29

## 2023-08-29 DIAGNOSIS — H00.011 HORDEOLUM EXTERNUM OF RIGHT UPPER EYELID: Primary | ICD-10-CM

## 2023-08-29 RX ORDER — AMOXICILLIN AND CLAVULANATE POTASSIUM 875; 125 MG/1; MG/1
1 TABLET, FILM COATED ORAL 2 TIMES DAILY
Qty: 14 TABLET | Refills: 0 | Status: SHIPPED | OUTPATIENT
Start: 2023-08-29 | End: 2023-09-05

## 2023-08-29 RX ORDER — FLUCONAZOLE 150 MG/1
150 TABLET ORAL ONCE
Qty: 1 TABLET | Refills: 0 | Status: SHIPPED | OUTPATIENT
Start: 2023-08-29 | End: 2023-08-29

## 2023-08-29 NOTE — TELEPHONE ENCOUNTER
Patient's stye on her eye is still visible, she said the cream didn't really help, would like a new recommendation.     Call [facility] [phone] [facility])

## 2025-05-15 ENCOUNTER — HOSPITAL ENCOUNTER (EMERGENCY)
Facility: HOSPITAL | Age: 40
Discharge: HOME | End: 2025-05-15
Attending: EMERGENCY MEDICINE

## 2025-05-15 ENCOUNTER — APPOINTMENT (OUTPATIENT)
Facility: HOSPITAL | Age: 40
End: 2025-05-15

## 2025-05-15 VITALS
RESPIRATION RATE: 16 BRPM | HEART RATE: 79 BPM | TEMPERATURE: 98.3 F | BODY MASS INDEX: 27.49 KG/M2 | SYSTOLIC BLOOD PRESSURE: 142 MMHG | WEIGHT: 171.08 LBS | OXYGEN SATURATION: 98 % | HEIGHT: 66 IN | DIASTOLIC BLOOD PRESSURE: 97 MMHG

## 2025-05-15 DIAGNOSIS — M79.602 LEFT ARM PAIN: Primary | ICD-10-CM

## 2025-05-15 LAB — ECHO BSA: 1.9 M2

## 2025-05-15 PROCEDURE — 93971 EXTREMITY STUDY: CPT

## 2025-05-15 PROCEDURE — 6360000002 HC RX W HCPCS: Performed by: EMERGENCY MEDICINE

## 2025-05-15 PROCEDURE — 96372 THER/PROPH/DIAG INJ SC/IM: CPT

## 2025-05-15 PROCEDURE — 99284 EMERGENCY DEPT VISIT MOD MDM: CPT

## 2025-05-15 RX ORDER — KETOROLAC TROMETHAMINE 30 MG/ML
30 INJECTION, SOLUTION INTRAMUSCULAR; INTRAVENOUS ONCE
Status: COMPLETED | OUTPATIENT
Start: 2025-05-15 | End: 2025-05-15

## 2025-05-15 RX ADMIN — KETOROLAC TROMETHAMINE 30 MG: 30 INJECTION, SOLUTION INTRAMUSCULAR at 13:25

## 2025-05-15 ASSESSMENT — LIFESTYLE VARIABLES
HOW OFTEN DO YOU HAVE A DRINK CONTAINING ALCOHOL: NEVER
HOW MANY STANDARD DRINKS CONTAINING ALCOHOL DO YOU HAVE ON A TYPICAL DAY: PATIENT DOES NOT DRINK

## 2025-05-15 ASSESSMENT — PAIN SCALES - GENERAL: PAINLEVEL_OUTOF10: 6

## 2025-05-15 NOTE — ED PROVIDER NOTES
[facility] DEPARTMENT  EMERGENCY DEPARTMENT ENCOUNTER      Patient Name: Maggie Matias  MRN: [id number]  Birthdate 1985  Date of Evaluation: 5/15/2025  Physician: Kevin Murillo MD    CHIEF COMPLAINT       Chief Complaint   Patient presents with    Arm Pain       HISTORY OF PRESENT ILLNESS   (Location/Symptom, Timing/Onset, Context/Setting, Quality, Duration, Modifying Factors, Severity)   Maggie Matias, 40 y.o., female     40-year-old female presents with left arm pain.  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  The pain started in her hand but now radiates up the arm to the shoulder.  She denies trauma          Nursing Notes were reviewed.    REVIEW OF SYSTEMS    (Not required)   Review of Systems    Except as noted above the remainder of the review of systems was reviewed and negative.     PAST MEDICAL HISTORY     Past Medical History:   Diagnosis Date    Abnormal Papanicolaou smear of cervix     Abnormal pap in early 20's - colpo - WNL now    Coagulation disorder     D & C for missed AB    Infertility, female     Femara and trigger shot with this pregnancy    Other unknown and unspecified cause of morbidity or mortality     PCOS    PCOS (polycystic ovarian syndrome)     Polycystic disease, ovaries        SURGICAL HISTORY       Past Surgical History:   Procedure Laterality Date    CHOLECYSTECTOMY, LAPAROSCOPIC  9/23/15     Dr. Hernandez    ORTHOPEDIC SURGERY      jonah in left femur and screws    OTHER SURGICAL HISTORY      [place] teeth removed 2008?    WISDOM TOOTH EXTRACTION         CURRENT MEDICATIONS       Previous Medications    CALCIUM CARBONATE (OS-ERNESTO) 1250 (500 CA) MG CHEWABLE TABLET    Take 1 tablet by mouth 2 times daily    ERYTHROMYCIN (ROMYCIN) 5 MG/GM OPHTHALMIC OINTMENT    Instill ~1 cm ribbon into affected eye(s) 4 times daily for 7 days (Ref)       ALLERGIES     Patient has no known allergies.    FAMILY HISTORY       Family History        [facility]  [address]  [place] [place] [facility] [address] [phone]    As needed, If symptoms worsen      DISCHARGE MEDICATIONS:  New Prescriptions    No medications on file     Controlled Substances Monitoring:          No data to display                (Please note that portions of this note were completed with a voice recognition program.  Efforts were made to edit the dictations but occasionally words are mis-transcribed.)    Kevin Murillo MD (electronically signed)  Attending Emergency Physician            Kevin Murillo MD  05/15/25 [id number]

## 2025-05-15 NOTE — ED TRIAGE NOTES
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 Pt states she noted pain when attempting to  her nephew. Pain worse with trying to  something.